# Patient Record
Sex: FEMALE | Race: WHITE | NOT HISPANIC OR LATINO | Employment: FULL TIME | URBAN - METROPOLITAN AREA
[De-identification: names, ages, dates, MRNs, and addresses within clinical notes are randomized per-mention and may not be internally consistent; named-entity substitution may affect disease eponyms.]

---

## 2017-01-23 ENCOUNTER — OFFICE VISIT (OUTPATIENT)
Dept: OBSTETRICS AND GYNECOLOGY | Facility: CLINIC | Age: 22
End: 2017-01-23

## 2017-01-23 VITALS
BODY MASS INDEX: 34.53 KG/M2 | HEIGHT: 67 IN | SYSTOLIC BLOOD PRESSURE: 116 MMHG | WEIGHT: 220 LBS | DIASTOLIC BLOOD PRESSURE: 72 MMHG | RESPIRATION RATE: 14 BRPM

## 2017-01-23 DIAGNOSIS — Z01.419 WELL WOMAN EXAM WITH ROUTINE GYNECOLOGICAL EXAM: Primary | ICD-10-CM

## 2017-01-23 DIAGNOSIS — N92.6 IRREGULAR MENSES: ICD-10-CM

## 2017-01-23 PROCEDURE — 99395 PREV VISIT EST AGE 18-39: CPT | Performed by: OBSTETRICS & GYNECOLOGY

## 2017-01-23 RX ORDER — ERGOCALCIFEROL 1.25 MG/1
50000 CAPSULE ORAL WEEKLY
COMMUNITY
End: 2018-01-19

## 2017-01-23 RX ORDER — MEDROXYPROGESTERONE ACETATE 150 MG/ML
150 INJECTION, SUSPENSION INTRAMUSCULAR
Qty: 1 EACH | Refills: 3 | Status: SHIPPED | OUTPATIENT
Start: 2017-01-23 | End: 2017-05-08 | Stop reason: ALTCHOICE

## 2017-01-23 NOTE — MR AVS SNAPSHOT
Alicia Rey   1/23/2017 2:10 PM   Office Visit    Dept Phone:  [phone]   Encounter #:  [id number]    Provider:  Prosper Borden MD   Department:  [facility]                Your Full Care Plan              Today's Medication Changes          These changes are accurate as of: 1/23/17  4:20 PM.  If you have any questions, ask your nurse or doctor.               New Medication(s)Ordered:     medroxyPROGESTERone 150 MG/ML injection   Commonly known as:  DEPO-PROVERA   Inject 1 mL into the shoulder, thigh, or buttocks Every 3 (Three) Months.   Started by:  Prosper Borden MD         Stop taking medication(s)listed here:     KRISTA-BE 0.35 MG tablet   Generic drug:  norethindrone   Stopped by:  Prosper Borden MD                Where to Get Your Medications      These medications were sent to [facility] [address] [place] [address] AT [facility] [phone] [place] [phone] [address] [address]     Phone:  [phone]     medroxyPROGESTERone 150 MG/ML injection                  Your Updated Medication List          This list is accurate as of: 1/23/17  4:20 PM.  Always use your most recent med list.                medroxyPROGESTERone 150 MG/ML injection   Commonly known as:  DEPO-PROVERA   Inject 1 mL into the shoulder, thigh, or buttocks Every 3 (Three) Months.       prenatal (CLASSIC) vitamin 28-0.8 MG tablet tablet       vitamin D 26299 UNITS capsule capsule   Commonly known as:  ERGOCALCIFEROL               You Were Diagnosed With        Codes Comments    Well woman exam with routine gynecological exam    -  Primary ICD-10-CM: Z01.419  ICD-9-CM: V72.31     Irregular menses     ICD-10-CM: N92.6  ICD-9-CM: 626.4       Instructions     None    Patient Instructions History      Upcoming Appointments     Visit Type Date Time Department    ANNUAL 1/23/2017  2:10 PM  "[facility]    INJECTION 2017  3:00 PM [facility]      MyChart Signup     [facility] allows you to send messages to your doctor, view your test results, renew your prescriptions, schedule appointments, and more. To sign up, go to [email or web address] and click on the Sign Up Now link in the New User? box. Enter your Gro Activation Code exactly as it appears below along with the last four digits of your Social Security Number and your Date of Birth () to complete the sign-up process. If you do not sign up before the expiration date, you must request a new code.    Gro Activation Code: [email or web address]  Expires: 2017  4:20 PM    If you have questions, you can email [email or web address] or call [phone] to talk to our Gro staff. Remember, Gro is NOT to be used for urgent needs. For medical emergencies, dial 911.               Other Info from Your Visit           Your Appointments     2017  3:00 PM EST   Injection with [facility]   [facility] [place] (--)    [address] [place] [id number] [phone]              Allergies     Wellbutrin [Bupropion]      Depakote [Divalproex Sodium]  GI Intolerance    Gabapentin  Irritability      Reason for Visit     Gynecologic Exam           Vital Signs     Blood Pressure Respirations Height Weight Last Menstrual Period Breastfeeding?    116/72 14 67\" (170.2 cm) 220 lb (99.8 kg) 2017 (Exact Date) Yes    Body Mass Index Smoking Status                34.46 kg/m2 Current Every Day Smoker          Problems and Diagnoses Noted     Well woman exam with routine gynecological exam    Irregular menses            "

## 2017-01-23 NOTE — PROGRESS NOTES
Subjective   Chief Complaint   Patient presents with   • Gynecologic Exam     Alicia Rey is a 21 y.o. year old  presenting to be seen for her annual exam.     SEXUAL Hx:  She is currently sexually active.  In the past year there has not been new sexual partners.    Condoms are typically used.  She would like to be screened for STD's at today's exam.  Current birth control method: OCP (progestin only).  She is not happy with her current method of contraception and does want to discuss alternative methods of contraception.  MENSTRUAL Hx:  Patient's last menstrual period was 2017 (exact date).  In the past 6 months her cycles have been unpredictable irregular.   Her menstrual flow is normal.   Each month on average there are roughly 4 days of very heavy flow.    Intermenstrual bleeding is absent.    Post-coital bleeding is absent.  Dysmenorrhea: is not affecting her activities of daily living  PMS: is not affecting her activities of daily living  Her cycles are a source of concern for her that she wishes to discuss today.  HEALTH Hx:  She exercises regularly: yes.  She wears her seat belt:yes.  She has concerns about domestic violence: no.  OTHER COMPLAINTS:  none    The following portions of the patient's history were reviewed and updated as appropriate:problem list, current medications, allergies, past family history, past medical history, past social history and past surgical history.    Smoking status: Current Every Day Smoker                                                   Packs/day: 0.25      Years: 0.00         Start date:      Smokeless status: Not on file                       Review of Systems  Consitutional POS: nothing reported    NEG: anorexia or night sweats   Gastointestinal POS: constipation and diarrhea    NEG: bloating, change in bowel habits, melena or reflux symptoms   Genitourinary POS: nothing reported    NEG: dysuria or hematuria   Integument POS: nothing reported    NEG:  "moles that are changing in size, shape, color or rashes   Breast POS: nothing reported    NEG: persistent breast lump, skin dimpling or nipple discharge        Objective   Visit Vitals   • /72   • Resp 14   • Ht 67\" (170.2 cm)   • Wt 220 lb (99.8 kg)   • LMP 01/18/2017 (Exact Date)   • Breastfeeding Yes   • BMI 34.46 kg/m2       General:  well developed; well nourished  no acute distress   Skin:  No suspicious lesions seen   Thyroid: normal to inspection and palpation   Breasts:  Not performed.   Abdomen: soft, non-tender; no masses  no umbilical or inginual hernias are present  no hepato-splenomegaly   Pelvis: Clinical staff was present for exam  External genitalia:  normal appearance of the external genitalia including Bartholin's and Casselman's glands.  :  urethral meatus normal; urethral hypermobility is absent.  Vaginal:  normal pink mucosa without prolapse or lesions.  Cervix:  normal appearance.  Uterus:  normal size, shape and consistency. retroverted;  Adnexa:  non palpable bilaterally.  Rectal:  digital rectal exam not performed; anus visually normal appearing.        Assessment   1. Normal GYN exam  2. Irregular menses - not significantly affecting her activities of daily living.  3. Desires Depo for ongoing contraception     Plan   1. Pap was not done today.  I explained to Alicia that the recommendations for Pap smear interval in a low risk patient has lengthened to 3 years time.  I told Alicia she still needs to be seen in our office yearly for a full physical including breast and pelvic exam.  2. The following tests were ordered today: STD swabs for GC, chlamydia and trichimoniasis.  It was explained to Alicia that all lab test should be back within the one week after they are performed. She will be notified about the results, regardless of the findings. If she has not been contacted by the office within 2 weeks after the test has been performed, it is her responsibility to contact us to learn " about her results.  3. Start Depo-provera ASAP  4. Follow up for annual exam 1 year    New Medications Ordered This Visit   Medications   • medroxyPROGESTERone (DEPO-PROVERA) 150 MG/ML injection     Sig: Inject 1 mL into the shoulder, thigh, or buttocks Every 3 (Three) Months.     Dispense:  1 each     Refill:  3          This note was electronically signed.    Prosper Borden M.D.  January 23, 2017

## 2017-01-24 ENCOUNTER — CLINICAL SUPPORT (OUTPATIENT)
Dept: OBSTETRICS AND GYNECOLOGY | Facility: CLINIC | Age: 22
End: 2017-01-24

## 2017-01-24 DIAGNOSIS — Z30.013 ENCOUNTER FOR INITIAL PRESCRIPTION OF INJECTABLE CONTRACEPTIVE: Primary | ICD-10-CM

## 2017-01-24 PROCEDURE — 96372 THER/PROPH/DIAG INJ SC/IM: CPT | Performed by: OBSTETRICS & GYNECOLOGY

## 2017-01-24 RX ORDER — MEDROXYPROGESTERONE ACETATE 150 MG/ML
150 INJECTION, SUSPENSION INTRAMUSCULAR ONCE
Status: COMPLETED | OUTPATIENT
Start: 2017-01-24 | End: 2017-01-24

## 2017-01-24 RX ADMIN — MEDROXYPROGESTERONE ACETATE 150 MG: 150 INJECTION, SUSPENSION INTRAMUSCULAR at 16:09

## 2017-05-08 ENCOUNTER — TELEPHONE (OUTPATIENT)
Dept: OBSTETRICS AND GYNECOLOGY | Facility: CLINIC | Age: 22
End: 2017-05-08

## 2017-05-08 RX ORDER — ACETAMINOPHEN AND CODEINE PHOSPHATE 120; 12 MG/5ML; MG/5ML
1 SOLUTION ORAL DAILY
Qty: 28 TABLET | Refills: 12 | Status: SHIPPED | OUTPATIENT
Start: 2017-05-08 | End: 2018-01-19

## 2017-08-02 ENCOUNTER — TELEPHONE (OUTPATIENT)
Dept: OBSTETRICS AND GYNECOLOGY | Facility: CLINIC | Age: 22
End: 2017-08-02

## 2017-08-02 DIAGNOSIS — O02.1 MISSED ABORTION: Primary | ICD-10-CM

## 2017-08-02 NOTE — TELEPHONE ENCOUNTER
----- Message from Carmel Amaya sent at 8/2/2017  3:31 PM EDT -----  Patient miscarried Friday 7/25/17 she just quit bleeding 2 days a go, wants to know how long her stomach should continue swollen.

## 2017-08-02 NOTE — TELEPHONE ENCOUNTER
Given the information we have, that's hard to answer.  I would expect her symptoms to be gradually improving, pain to gradually improving bleeding to gradually improved as well.  If any of these are not true she does need to be seen to have this further investigated.    We would want her to have a pregnancy test done about 2 weeks after she thinks the miscarriage occurred to make sure in fact that all of the tissue has passed.

## 2017-08-07 NOTE — TELEPHONE ENCOUNTER
Pt notified. 2 weeks will be this Friday 08/11/2017. [facility] order placed.

## 2017-09-06 ENCOUNTER — TELEPHONE (OUTPATIENT)
Dept: OBSTETRICS AND GYNECOLOGY | Facility: CLINIC | Age: 22
End: 2017-09-06

## 2017-10-11 ENCOUNTER — TELEPHONE (OUTPATIENT)
Dept: OBSTETRICS AND GYNECOLOGY | Facility: CLINIC | Age: 22
End: 2017-10-11

## 2017-10-11 DIAGNOSIS — Z34.90 PREGNANCY, UNSPECIFIED GESTATIONAL AGE: Primary | ICD-10-CM

## 2017-10-12 ENCOUNTER — TELEPHONE (OUTPATIENT)
Dept: OBSTETRICS AND GYNECOLOGY | Facility: CLINIC | Age: 22
End: 2017-10-12

## 2017-10-12 ENCOUNTER — APPOINTMENT (OUTPATIENT)
Dept: LAB | Facility: HOSPITAL | Age: 22
End: 2017-10-12

## 2017-10-12 LAB — HCG INTACT+B SERPL-ACNC: <5 MIU/ML

## 2017-10-12 PROCEDURE — 84702 CHORIONIC GONADOTROPIN TEST: CPT | Performed by: OBSTETRICS & GYNECOLOGY

## 2017-10-12 PROCEDURE — 36415 COLL VENOUS BLD VENIPUNCTURE: CPT | Performed by: OBSTETRICS & GYNECOLOGY

## 2017-12-01 ENCOUNTER — TELEPHONE (OUTPATIENT)
Dept: OBSTETRICS AND GYNECOLOGY | Facility: CLINIC | Age: 22
End: 2017-12-01

## 2017-12-01 NOTE — TELEPHONE ENCOUNTER
I called Clarisse to discuss her complaints.  She states that she had a UTI appx. 1 month ago and was treated with Bactrim.  Now she is having abdominal pain, vaginal discharge, itching, and dysuria.  She is unable to come to the office this afternoon because she has no transportation.  We talked about the need to distinguish between a UTI and a vaginal infection so that the appropriate treatment can be given.  She will seek care at an urgent treatment center later today when she can arrange transportation.

## 2017-12-13 ENCOUNTER — HOSPITAL ENCOUNTER (EMERGENCY)
Facility: HOSPITAL | Age: 22
Discharge: HOME | End: 2017-12-14
Attending: EMERGENCY MEDICINE | Admitting: NURSE PRACTITIONER

## 2017-12-13 VITALS
HEART RATE: 118 BPM | TEMPERATURE: 97.8 F | SYSTOLIC BLOOD PRESSURE: 111 MMHG | RESPIRATION RATE: 18 BRPM | OXYGEN SATURATION: 96 % | DIASTOLIC BLOOD PRESSURE: 65 MMHG | HEIGHT: 69 IN | BODY MASS INDEX: 30.51 KG/M2 | WEIGHT: 206 LBS

## 2017-12-13 DIAGNOSIS — Z34.90 EARLY STAGE OF PREGNANCY: ICD-10-CM

## 2017-12-13 DIAGNOSIS — R51.9 NONINTRACTABLE HEADACHE, UNSPECIFIED CHRONICITY PATTERN, UNSPECIFIED HEADACHE TYPE: Primary | ICD-10-CM

## 2017-12-13 LAB
ALBUMIN SERPL-MCNC: 4.8 G/DL (ref 3.2–4.8)
ALBUMIN/GLOB SERPL: 1.6 G/DL (ref 1.5–2.5)
ALP SERPL-CCNC: 110 U/L (ref 25–100)
ALT SERPL W P-5'-P-CCNC: 21 U/L (ref 7–40)
ANION GAP SERPL CALCULATED.3IONS-SCNC: 10 MMOL/L (ref 3–11)
AST SERPL-CCNC: 18 U/L (ref 0–33)
B-HCG UR QL: POSITIVE
BASOPHILS # BLD AUTO: 0.01 10*3/MM3 (ref 0–0.2)
BASOPHILS NFR BLD AUTO: 0.1 % (ref 0–1)
BILIRUB SERPL-MCNC: 0.5 MG/DL (ref 0.3–1.2)
BUN BLD-MCNC: 10 MG/DL (ref 9–23)
BUN/CREAT SERPL: 16.7 (ref 7–25)
CALCIUM SPEC-SCNC: 9.9 MG/DL (ref 8.7–10.4)
CHLORIDE SERPL-SCNC: 106 MMOL/L (ref 99–109)
CO2 SERPL-SCNC: 24 MMOL/L (ref 20–31)
CREAT BLD-MCNC: 0.6 MG/DL (ref 0.6–1.3)
DEPRECATED RDW RBC AUTO: 42.1 FL (ref 37–54)
EOSINOPHIL # BLD AUTO: 0.03 10*3/MM3 (ref 0–0.3)
EOSINOPHIL NFR BLD AUTO: 0.3 % (ref 0–3)
ERYTHROCYTE [DISTWIDTH] IN BLOOD BY AUTOMATED COUNT: 13.4 % (ref 11.3–14.5)
GFR SERPL CREATININE-BSD FRML MDRD: 125 ML/MIN/1.73
GLOBULIN UR ELPH-MCNC: 3 GM/DL
GLUCOSE BLD-MCNC: 92 MG/DL (ref 70–100)
HCG INTACT+B SERPL-ACNC: 326 MIU/ML
HCT VFR BLD AUTO: 43.7 % (ref 34.5–44)
HGB BLD-MCNC: 14.6 G/DL (ref 11.5–15.5)
HOLD SPECIMEN: NORMAL
HOLD SPECIMEN: NORMAL
IMM GRANULOCYTES # BLD: 0.03 10*3/MM3 (ref 0–0.03)
IMM GRANULOCYTES NFR BLD: 0.3 % (ref 0–0.6)
INTERNAL NEGATIVE CONTROL: ABNORMAL
INTERNAL POSITIVE CONTROL: ABNORMAL
LIPASE SERPL-CCNC: 31 U/L (ref 6–51)
LYMPHOCYTES # BLD AUTO: 2.14 10*3/MM3 (ref 0.6–4.8)
LYMPHOCYTES NFR BLD AUTO: 20.6 % (ref 24–44)
Lab: ABNORMAL
MCH RBC QN AUTO: 28.8 PG (ref 27–31)
MCHC RBC AUTO-ENTMCNC: 33.4 G/DL (ref 32–36)
MCV RBC AUTO: 86.2 FL (ref 80–99)
MONOCYTES # BLD AUTO: 0.57 10*3/MM3 (ref 0–1)
MONOCYTES NFR BLD AUTO: 5.5 % (ref 0–12)
NEUTROPHILS # BLD AUTO: 7.62 10*3/MM3 (ref 1.5–8.3)
NEUTROPHILS NFR BLD AUTO: 73.2 % (ref 41–71)
PLATELET # BLD AUTO: 319 10*3/MM3 (ref 150–450)
PMV BLD AUTO: 9.8 FL (ref 6–12)
POTASSIUM BLD-SCNC: 3.6 MMOL/L (ref 3.5–5.5)
PROT SERPL-MCNC: 7.8 G/DL (ref 5.7–8.2)
RBC # BLD AUTO: 5.07 10*6/MM3 (ref 3.89–5.14)
SODIUM BLD-SCNC: 140 MMOL/L (ref 132–146)
WBC NRBC COR # BLD: 10.4 10*3/MM3 (ref 3.5–10.8)
WHOLE BLOOD HOLD SPECIMEN: NORMAL
WHOLE BLOOD HOLD SPECIMEN: NORMAL

## 2017-12-13 PROCEDURE — 85025 COMPLETE CBC W/AUTO DIFF WBC: CPT | Performed by: EMERGENCY MEDICINE

## 2017-12-13 PROCEDURE — 80053 COMPREHEN METABOLIC PANEL: CPT | Performed by: EMERGENCY MEDICINE

## 2017-12-13 PROCEDURE — 93005 ELECTROCARDIOGRAM TRACING: CPT

## 2017-12-13 PROCEDURE — 84702 CHORIONIC GONADOTROPIN TEST: CPT | Performed by: EMERGENCY MEDICINE

## 2017-12-13 PROCEDURE — 99283 EMERGENCY DEPT VISIT LOW MDM: CPT

## 2017-12-13 PROCEDURE — 81001 URINALYSIS AUTO W/SCOPE: CPT | Performed by: EMERGENCY MEDICINE

## 2017-12-13 PROCEDURE — 83690 ASSAY OF LIPASE: CPT | Performed by: EMERGENCY MEDICINE

## 2017-12-13 RX ORDER — SODIUM CHLORIDE 0.9 % (FLUSH) 0.9 %
10 SYRINGE (ML) INJECTION AS NEEDED
Status: DISCONTINUED | OUTPATIENT
Start: 2017-12-13 | End: 2017-12-14 | Stop reason: HOSPADM

## 2017-12-13 RX ORDER — ACETAMINOPHEN 500 MG
1000 TABLET ORAL ONCE
Status: COMPLETED | OUTPATIENT
Start: 2017-12-13 | End: 2017-12-13

## 2017-12-13 RX ADMIN — ACETAMINOPHEN 1000 MG: 500 TABLET ORAL at 23:42

## 2017-12-14 LAB
BACTERIA UR QL AUTO: ABNORMAL /HPF
BILIRUB UR QL STRIP: NEGATIVE
CLARITY UR: ABNORMAL
COLOR UR: YELLOW
GLUCOSE UR STRIP-MCNC: NEGATIVE MG/DL
HGB UR QL STRIP.AUTO: ABNORMAL
HYALINE CASTS UR QL AUTO: ABNORMAL /LPF
KETONES UR QL STRIP: ABNORMAL
LEUKOCYTE ESTERASE UR QL STRIP.AUTO: NEGATIVE
NITRITE UR QL STRIP: NEGATIVE
PH UR STRIP.AUTO: 6 [PH] (ref 5–8)
PROT UR QL STRIP: NEGATIVE
RBC # UR: ABNORMAL /HPF
REF LAB TEST METHOD: ABNORMAL
SP GR UR STRIP: 1.02 (ref 1–1.03)
SQUAMOUS #/AREA URNS HPF: ABNORMAL /HPF
UROBILINOGEN UR QL STRIP: ABNORMAL
WBC UR QL AUTO: ABNORMAL /HPF

## 2017-12-14 NOTE — ED PROVIDER NOTES
Subjective   HPI Comments: Ms. Alicia Rey is a 22 y.o. female who is 4 weeks pregnant and presents to the ED with c/o dizziness. Ms. Rey is sitting comfortably and playing on her phone when I enter the room. She reports that she was at work earlier today when she suddenly developed tunnel vision, blurry vision, dizziness, and back pain and shortly after also developed a migraine that starts in the front of her head and radiates to the back of her head. She also complains of urinary frequency and pain when turning her neck but she denies dysuria, vaginal bleeding, and vaginal discharge. She states that her last period was November 15th. Her OBGYN is Dr. Borden, who she has been seeing for her pregnancy but she has not had an ultrasound yet. She has not taken any medications for her pain. No other acute complaints at this time.    G/P : 11/2    Patient is a 22 y.o. female presenting with pregnancy problem.   History provided by:  Patient  Pregnancy Problem   The current episode started today. The problem has been gradually improving. The problem occurs constantly. Episode is moderate. Gestational age is 4 weeks. Patient reports no vaginal bleeding and no vaginal discharge. Primary symptoms: dizziness, back pain.   Associated symptoms include no dysuria.       Review of Systems   Genitourinary: Positive for frequency. Negative for dysuria, vaginal bleeding and vaginal discharge.   Musculoskeletal: Positive for neck pain.   All other systems reviewed and are negative.      Past Medical History:   Diagnosis Date   • Bipolar affective disorder    • Post partum depression    • PTSD (post-traumatic stress disorder)        Allergies   Allergen Reactions   • Naproxen Hives   • Tomato Hives   • Wellbutrin [Bupropion]    • Depakote [Divalproex Sodium] GI Intolerance   • Gabapentin Irritability       Past Surgical History:   Procedure Laterality Date   • TOOTH EXTRACTION  10/2016   • WISDOM TOOTH EXTRACTION  2010        Family History   Problem Relation Age of Onset   • Breast cancer Maternal Aunt    • Breast cancer Maternal Grandmother        Social History     Social History   • Marital status: Single     Spouse name: N/A   • Number of children: N/A   • Years of education: N/A     Social History Main Topics   • Smoking status: Current Every Day Smoker     Packs/day: 0.25     Start date:    • Smokeless tobacco: None   • Alcohol use 1.2 oz/week     2 Cans of beer per week   • Drug use: Yes     Special: Marijuana, MDMA (ecstacy)      Comment: Last used MDMA    • Sexual activity: Yes     Partners: Male     Other Topics Concern   • None     Social History Narrative   • None         Objective   Physical Exam   Constitutional: She is oriented to person, place, and time. She appears well-developed and well-nourished. No distress.   Pt is sitting in bed at this time, playing a game on her cell phone.    HENT:   Head: Normocephalic and atraumatic.   Right Ear: External ear normal.   Left Ear: External ear normal.   Nose: Nose normal.   Mouth/Throat: Oropharynx is clear and moist. No oropharyngeal exudate.   Eyes: EOM are normal. Pupils are equal, round, and reactive to light.   Neck: Normal range of motion.   Cardiovascular: Tachycardia present.    Pulmonary/Chest: Effort normal and breath sounds normal. No respiratory distress.   Abdominal: Soft. Bowel sounds are normal. She exhibits no distension. There is no tenderness.   Genitourinary:   Genitourinary Comments:    Musculoskeletal: Normal range of motion. She exhibits no tenderness.   Neurological: She is alert and oriented to person, place, and time. No cranial nerve deficit.   Skin: Skin is warm and dry.   Psychiatric: She has a normal mood and affect.   Nursing note and vitals reviewed.      Procedures         ED Course  ED Course   Comment By Time   Pt declined IV at this time.  Patient will be discharged home.  Patient to follow-up with OB/GYN.  Encouraged to  return for any vaginal bleeding, leaking of any fluid or any worsening symptoms.  Patient agrees and verbalizes understanding. Yane Parham, APRN 12/13 0457         Recent Results (from the past 24 hour(s))   Comprehensive Metabolic Panel    Collection Time: 12/13/17  9:43 PM   Result Value Ref Range    Glucose 92 70 - 100 mg/dL    BUN 10 9 - 23 mg/dL    Creatinine 0.60 0.60 - 1.30 mg/dL    Sodium 140 132 - 146 mmol/L    Potassium 3.6 3.5 - 5.5 mmol/L    Chloride 106 99 - 109 mmol/L    CO2 24.0 20.0 - 31.0 mmol/L    Calcium 9.9 8.7 - 10.4 mg/dL    Total Protein 7.8 5.7 - 8.2 g/dL    Albumin 4.80 3.20 - 4.80 g/dL    ALT (SGPT) 21 7 - 40 U/L    AST (SGOT) 18 0 - 33 U/L    Alkaline Phosphatase 110 (H) 25 - 100 U/L    Total Bilirubin 0.5 0.3 - 1.2 mg/dL    eGFR Non African Amer 125 >60 mL/min/1.73    Globulin 3.0 gm/dL    A/G Ratio 1.6 1.5 - 2.5 g/dL    BUN/Creatinine Ratio 16.7 7.0 - 25.0    Anion Gap 10.0 3.0 - 11.0 mmol/L   Lipase    Collection Time: 12/13/17  9:43 PM   Result Value Ref Range    Lipase 31 6 - 51 U/L   Light Blue Top    Collection Time: 12/13/17  9:43 PM   Result Value Ref Range    Extra Tube hold for add-on    Green Top (Gel)    Collection Time: 12/13/17  9:43 PM   Result Value Ref Range    Extra Tube Hold for add-ons.    Lavender Top    Collection Time: 12/13/17  9:43 PM   Result Value Ref Range    Extra Tube hold for add-on    Gold Top - SST    Collection Time: 12/13/17  9:43 PM   Result Value Ref Range    Extra Tube Hold for add-ons.    CBC Auto Differential    Collection Time: 12/13/17  9:43 PM   Result Value Ref Range    WBC 10.40 3.50 - 10.80 10*3/mm3    RBC 5.07 3.89 - 5.14 10*6/mm3    Hemoglobin 14.6 11.5 - 15.5 g/dL    Hematocrit 43.7 34.5 - 44.0 %    MCV 86.2 80.0 - 99.0 fL    MCH 28.8 27.0 - 31.0 pg    MCHC 33.4 32.0 - 36.0 g/dL    RDW 13.4 11.3 - 14.5 %    RDW-SD 42.1 37.0 - 54.0 fl    MPV 9.8 6.0 - 12.0 fL    Platelets 319 150 - 450 10*3/mm3    Neutrophil % 73.2 (H) 41.0 - 71.0 %     "Lymphocyte % 20.6 (L) 24.0 - 44.0 %    Monocyte % 5.5 0.0 - 12.0 %    Eosinophil % 0.3 0.0 - 3.0 %    Basophil % 0.1 0.0 - 1.0 %    Immature Grans % 0.3 0.0 - 0.6 %    Neutrophils, Absolute 7.62 1.50 - 8.30 10*3/mm3    Lymphocytes, Absolute 2.14 0.60 - 4.80 10*3/mm3    Monocytes, Absolute 0.57 0.00 - 1.00 10*3/mm3    Eosinophils, Absolute 0.03 0.00 - 0.30 10*3/mm3    Basophils, Absolute 0.01 0.00 - 0.20 10*3/mm3    Immature Grans, Absolute 0.03 0.00 - 0.03 10*3/mm3   hCG, Quantitative, Pregnancy    Collection Time: 12/13/17  9:43 PM   Result Value Ref Range    HCG Quantitative 326.00 mIU/mL   POCT pregnancy, urine    Collection Time: 12/13/17 11:52 PM   Result Value Ref Range    HCG, Urine, QL Positive (A) Negative    Lot Number [id number]     Internal Positive Control Presumptive Positive     Internal Negative Control Presumptive Negative      Note: In addition to lab results from this visit, the labs listed above may include labs taken at another facility or during a different encounter within the last 24 hours. Please correlate lab times with ED admission and discharge times for further clarification of the services performed during this visit.    No orders to display     Vitals:    12/13/17 2132 12/13/17 2344 12/13/17 2345   BP: 119/69 111/65    Pulse: 118     Resp: 18     Temp: 97.8 °F (36.6 °C)     TempSrc: Oral     SpO2: 98%  96%   Weight: 93.4 kg (206 lb)     Height: 175.3 cm (69\")       Medications   sodium chloride 0.9 % flush 10 mL (not administered)   sodium chloride 0.9 % flush 10 mL (not administered)   sodium chloride 0.9 % bolus 1,000 mL (not administered)   acetaminophen (TYLENOL) tablet 1,000 mg (1,000 mg Oral Given 12/13/17 2342)     ECG/EMG Results (last 24 hours)     Procedure Component Value Units Date/Time    ECG 12 Lead [[id number]] Collected:  12/13/17 2141     Updated:  12/13/17 2140                    [facility]    Final diagnoses:   Nonintractable headache, unspecified chronicity pattern, " unspecified headache type   Early stage of pregnancy       Documentation assistance provided by bree Pickett.  Information recorded by the bree was done at my direction and has been verified and validated by me.     Mirna Pickett  12/13/17 2312       Mirna Pickett  12/14/17 0002       Yane Parham, APRN  12/14/17 3434

## 2017-12-19 ENCOUNTER — TELEPHONE (OUTPATIENT)
Dept: OBSTETRICS AND GYNECOLOGY | Facility: CLINIC | Age: 22
End: 2017-12-19

## 2017-12-19 NOTE — TELEPHONE ENCOUNTER
DR JACOB PATIENT THINKS HAS THE FLU - AND WANTS TO KNOW WHAT NEEDS TO DO AND WHAT MEDICATION TO TAKE

## 2018-01-03 ENCOUNTER — OFFICE VISIT (OUTPATIENT)
Dept: RETAIL CLINIC | Facility: CLINIC | Age: 23
End: 2018-01-03

## 2018-01-03 VITALS
BODY MASS INDEX: 30.36 KG/M2 | HEART RATE: 84 BPM | HEIGHT: 69 IN | WEIGHT: 205 LBS | RESPIRATION RATE: 20 BRPM | TEMPERATURE: 98.5 F | OXYGEN SATURATION: 98 %

## 2018-01-03 DIAGNOSIS — H66.92 LEFT OTITIS MEDIA, UNSPECIFIED OTITIS MEDIA TYPE: Primary | ICD-10-CM

## 2018-01-03 DIAGNOSIS — H60.502 ACUTE OTITIS EXTERNA OF LEFT EAR, UNSPECIFIED TYPE: ICD-10-CM

## 2018-01-03 PROCEDURE — 99213 OFFICE O/P EST LOW 20 MIN: CPT | Performed by: NURSE PRACTITIONER

## 2018-01-03 RX ORDER — NITROFURANTOIN 25; 75 MG/1; MG/1
100 CAPSULE ORAL 2 TIMES DAILY
COMMUNITY
End: 2018-01-19

## 2018-01-03 RX ORDER — CEPHALEXIN 250 MG/1
500 CAPSULE ORAL 2 TIMES DAILY
Qty: 40 CAPSULE | Refills: 0 | Status: SHIPPED | OUTPATIENT
Start: 2018-01-03 | End: 2018-01-13

## 2018-01-03 NOTE — PROGRESS NOTES
Subjective   Alicia Rey is a 22 y.o. female.     Earache    There is pain in the left ear. This is a new problem. The current episode started today. The problem occurs constantly. The problem has been gradually worsening. There has been no fever. The pain is at a severity of 4/10. The pain is mild. Associated symptoms include coughing, headaches (mild) and rhinorrhea. Pertinent negatives include no abdominal pain, diarrhea, ear discharge, hearing loss, neck pain, rash, sore throat or vomiting. She has tried nothing for the symptoms. The treatment provided no relief. Her past medical history is significant for a chronic ear infection. There is no history of a tympanostomy tube.        Current Outpatient Prescriptions on File Prior to Visit   Medication Sig Dispense Refill   • Prenatal Vit-Fe Fumarate-FA (PRENATAL, CLASSIC, VITAMIN) 28-0.8 MG tablet tablet Take  by mouth daily.     • vitamin D (ERGOCALCIFEROL) 66055 UNITS capsule capsule Take 50,000 Units by mouth 1 (One) Time Per Week.     • norethindrone (NOR-QD) 0.35 MG tablet Take 1 tablet by mouth Daily. 28 tablet 12     No current facility-administered medications on file prior to visit.        Allergies   Allergen Reactions   • Bactrim [Sulfamethoxazole-Trimethoprim] Nausea And Vomiting   • Ciprofloxacin Hcl Nausea And Vomiting   • Naproxen Hives   • Tomato Hives   • Wellbutrin [Bupropion]    • Depakote [Divalproex Sodium] GI Intolerance   • Gabapentin Irritability       Past Medical History:   Diagnosis Date   • Bipolar affective disorder    • Post partum depression    • PTSD (post-traumatic stress disorder)        Past Surgical History:   Procedure Laterality Date   • TOOTH EXTRACTION  10/2016   • WISDOM TOOTH EXTRACTION  2010       Family History   Problem Relation Age of Onset   • Breast cancer Maternal Aunt    • Breast cancer Maternal Grandmother        Social History     Social History   • Marital status: Single     Spouse name: N/A   • Number of  "children: N/A   • Years of education: N/A     Occupational History   • Not on file.     Social History Main Topics   • Smoking status: Current Every Day Smoker     Packs/day: 0.25     Start date: 2009   • Smokeless tobacco: Not on file   • Alcohol use 1.2 oz/week     2 Cans of beer per week   • Drug use: Yes     Special: Marijuana, MDMA (ecstacy)      Comment: Last used MDMA 2011   • Sexual activity: Yes     Partners: Male     Other Topics Concern   • Not on file     Social History Narrative   • No narrative on file       Review of Systems   Constitutional: Positive for activity change and appetite change.   HENT: Positive for ear pain, rhinorrhea and sneezing. Negative for ear discharge, hearing loss, sinus pain, sinus pressure and sore throat.    Eyes: Negative for pain, discharge and itching.   Respiratory: Positive for cough.    Gastrointestinal: Negative for abdominal pain, diarrhea and vomiting.   Musculoskeletal: Positive for myalgias (mild). Negative for neck pain.   Skin: Negative for rash.   Allergic/Immunologic: Negative for environmental allergies.   Neurological: Positive for headaches (mild).       Pulse 84  Temp 98.5 °F (36.9 °C) (Oral)   Resp 20  Ht 175.3 cm (69\")  Wt 93 kg (205 lb)  LMP 11/15/2017 (Exact Date)  SpO2 98%  BMI 30.27 kg/m2    Objective   Physical Exam   Constitutional: She is oriented to person, place, and time. She appears well-developed and well-nourished. She is cooperative. She appears ill.   HENT:   Head: Normocephalic and atraumatic.   Right Ear: Tympanic membrane, external ear and ear canal normal.   Left Ear: Ear canal normal. There is swelling and tenderness. Tympanic membrane is erythematous.   Nose: Rhinorrhea present. Right sinus exhibits no maxillary sinus tenderness and no frontal sinus tenderness. Left sinus exhibits no maxillary sinus tenderness and no frontal sinus tenderness.   Mouth/Throat: Uvula is midline, oropharynx is clear and moist and mucous membranes " are normal. No posterior oropharyngeal erythema.   Eyes: Conjunctivae and lids are normal.   Cardiovascular: Normal heart sounds.    Pulmonary/Chest: Effort normal and breath sounds normal.   Lymphadenopathy:     She has cervical adenopathy.   Neurological: She is alert and oriented to person, place, and time.   Skin: Skin is warm and dry. No rash noted.   Psychiatric: She has a normal mood and affect. Her speech is normal and behavior is normal.         Assessment/Plan   Diagnoses and all orders for this visit:    Left otitis media, unspecified otitis media type  -     cephalexin (KEFLEX) 250 MG capsule; Take 2 capsules by mouth 2 (Two) Times a Day for 10 days.    Acute otitis externa of left ear, unspecified type  -     cephalexin (KEFLEX) 250 MG capsule; Take 2 capsules by mouth 2 (Two) Times a Day for 10 days.        Results for orders placed or performed during the hospital encounter of 12/13/17   Comprehensive Metabolic Panel   Result Value Ref Range    Glucose 92 70 - 100 mg/dL    BUN 10 9 - 23 mg/dL    Creatinine 0.60 0.60 - 1.30 mg/dL    Sodium 140 132 - 146 mmol/L    Potassium 3.6 3.5 - 5.5 mmol/L    Chloride 106 99 - 109 mmol/L    CO2 24.0 20.0 - 31.0 mmol/L    Calcium 9.9 8.7 - 10.4 mg/dL    Total Protein 7.8 5.7 - 8.2 g/dL    Albumin 4.80 3.20 - 4.80 g/dL    ALT (SGPT) 21 7 - 40 U/L    AST (SGOT) 18 0 - 33 U/L    Alkaline Phosphatase 110 (H) 25 - 100 U/L    Total Bilirubin 0.5 0.3 - 1.2 mg/dL    eGFR Non African Amer 125 >60 mL/min/1.73    Globulin 3.0 gm/dL    A/G Ratio 1.6 1.5 - 2.5 g/dL    BUN/Creatinine Ratio 16.7 7.0 - 25.0    Anion Gap 10.0 3.0 - 11.0 mmol/L   Lipase   Result Value Ref Range    Lipase 31 6 - 51 U/L   Urinalysis With / Culture If Indicated - Urine, Clean Catch   Result Value Ref Range    Color, UA Yellow Yellow, Straw    Appearance, UA Cloudy (A) Clear    pH, UA 6.0 5.0 - 8.0    Specific Gravity, UA 1.016 1.001 - 1.030    Glucose, UA Negative Negative    Ketones, UA 15 mg/dL (1+)  (A) Negative    Bilirubin, UA Negative Negative    Blood, UA Small (1+) (A) Negative    Protein, UA Negative Negative    Leuk Esterase, UA Negative Negative    Nitrite, UA Negative Negative    Urobilinogen, UA 0.2 E.U./dL 0.2 - 1.0 E.U./dL   CBC Auto Differential   Result Value Ref Range    WBC 10.40 3.50 - 10.80 10*3/mm3    RBC 5.07 3.89 - 5.14 10*6/mm3    Hemoglobin 14.6 11.5 - 15.5 g/dL    Hematocrit 43.7 34.5 - 44.0 %    MCV 86.2 80.0 - 99.0 fL    MCH 28.8 27.0 - 31.0 pg    MCHC 33.4 32.0 - 36.0 g/dL    RDW 13.4 11.3 - 14.5 %    RDW-SD 42.1 37.0 - 54.0 fl    MPV 9.8 6.0 - 12.0 fL    Platelets 319 150 - 450 10*3/mm3    Neutrophil % 73.2 (H) 41.0 - 71.0 %    Lymphocyte % 20.6 (L) 24.0 - 44.0 %    Monocyte % 5.5 0.0 - 12.0 %    Eosinophil % 0.3 0.0 - 3.0 %    Basophil % 0.1 0.0 - 1.0 %    Immature Grans % 0.3 0.0 - 0.6 %    Neutrophils, Absolute 7.62 1.50 - 8.30 10*3/mm3    Lymphocytes, Absolute 2.14 0.60 - 4.80 10*3/mm3    Monocytes, Absolute 0.57 0.00 - 1.00 10*3/mm3    Eosinophils, Absolute 0.03 0.00 - 0.30 10*3/mm3    Basophils, Absolute 0.01 0.00 - 0.20 10*3/mm3    Immature Grans, Absolute 0.03 0.00 - 0.03 10*3/mm3   hCG, Quantitative, Pregnancy   Result Value Ref Range    HCG Quantitative 326.00 mIU/mL   Urinalysis, Microscopic Only - Urine, Clean Catch   Result Value Ref Range    RBC, UA 3-6 (A) None Seen, 0-2 /HPF    WBC, UA 0-2 (A) None Seen /HPF    Bacteria, UA None Seen None Seen, Trace /HPF    Squamous Epithelial Cells, UA 3-6 (A) None Seen, 0-2 /HPF    Hyaline Casts, UA 0-6 0 - 6 /LPF    Methodology Automated Microscopy    POCT pregnancy, urine   Result Value Ref Range    HCG, Urine, QL Positive (A) Negative    Lot Number [id number]     Internal Positive Control Presumptive Positive     Internal Negative Control Presumptive Negative    Light Blue Top   Result Value Ref Range    Extra Tube hold for add-on    Green Top (Gel)   Result Value Ref Range    Extra Tube Hold for add-ons.    Lavender Top    Result Value Ref Range    Extra Tube hold for add-on    Gold Top - SST   Result Value Ref Range    Extra Tube Hold for add-ons.        Follow up with PCP or go to the nearest emergency room if symptoms worsen or fail to improve.

## 2018-01-03 NOTE — PATIENT INSTRUCTIONS
"Otitis Externa  Otitis externa is a bacterial or fungal infection of the outer ear canal. This is the area from the eardrum to the outside of the ear. Otitis externa is sometimes called \"swimmer's ear.\"  CAUSES   Possible causes of infection include:  · Swimming in dirty water.  · Moisture remaining in the ear after swimming or bathing.  · Mild injury (trauma) to the ear.  · Objects stuck in the ear (foreign body).  · Cuts or scrapes (abrasions) on the outside of the ear.  SIGNS AND SYMPTOMS   The first symptom of infection is often itching in the ear canal. Later signs and symptoms may include swelling and redness of the ear canal, ear pain, and yellowish-white fluid (pus) coming from the ear. The ear pain may be worse when pulling on the earlobe.  DIAGNOSIS   Your health care provider will perform a physical exam. A sample of fluid may be taken from the ear and examined for bacteria or fungi.  TREATMENT   Antibiotic ear drops are often given for 10 to 14 days. Treatment may also include pain medicine or corticosteroids to reduce itching and swelling.  HOME CARE INSTRUCTIONS   · Apply antibiotic ear drops to the ear canal as prescribed by your health care provider.  · Take medicines only as directed by your health care provider.  · If you have diabetes, follow any additional treatment instructions from your health care provider.  · Keep all follow-up visits as directed by your health care provider.  PREVENTION   · Keep your ear dry. Use the corner of a towel to absorb water out of the ear canal after swimming or bathing.  · Avoid scratching or putting objects inside your ear. This can damage the ear canal or remove the protective wax that lines the canal. This makes it easier for bacteria and fungi to grow.  · Avoid swimming in lakes, polluted water, or poorly chlorinated pools.  · You may use ear drops made of rubbing alcohol and vinegar after swimming. Combine equal parts of white vinegar and alcohol in a bottle. " Put 3 or 4 drops into each ear after swimming.  SEEK MEDICAL CARE IF:   · You have a fever.  · Your ear is still red, swollen, painful, or draining pus after 3 days.  · Your redness, swelling, or pain gets worse.  · You have a severe headache.  · You have redness, swelling, pain, or tenderness in the area behind your ear.  MAKE SURE YOU:   · Understand these instructions.  · Will watch your condition.  · Will get help right away if you are not doing well or get worse.     This information is not intended to replace advice given to you by your health care provider. Make sure you discuss any questions you have with your health care provider.     Document Released: 12/18/2006 Document Revised: 01/08/2016 Document Reviewed: 09/26/2016  [employer] Interactive Patient Education ©2017 [employer]    Otitis Media, Adult  Otitis media is redness, soreness, and puffiness (swelling) in the space just behind your eardrum (middle ear). It may be caused by allergies or infection. It often happens along with a cold.  HOME CARE  · Take your medicine as told. Finish it even if you start to feel better.  · Only take over-the-counter or prescription medicines for pain, discomfort, or fever as told by your doctor.  · Follow up with your doctor as told.  GET HELP IF:  · You have otitis media only in one ear, or bleeding from your nose, or both.  · You notice a lump on your neck.  · You are not getting better in 3-5 days.  · You feel worse instead of better.  GET HELP RIGHT AWAY IF:   · You have pain that is not helped with medicine.  · You have puffiness, redness, or pain around your ear.  · You get a stiff neck.  · You cannot move part of your face (paralysis).  · You notice that the bone behind your ear hurts when you touch it.  MAKE SURE YOU:   · Understand these instructions.  · Will watch your condition.  · Will get help right away if you are not doing well or get worse.     This information is not intended to replace advice given  to you by your health care provider. Make sure you discuss any questions you have with your health care provider.     Document Released: 06/05/2009 Document Revised: 01/08/2016 Document Reviewed: 07/15/2014  [employer] Interactive Patient Education ©2017 [employer]

## 2018-01-04 PROBLEM — Z34.80 PRENATAL CARE, SUBSEQUENT PREGNANCY: Status: ACTIVE | Noted: 2018-01-04

## 2018-01-19 ENCOUNTER — APPOINTMENT (OUTPATIENT)
Dept: LAB | Facility: HOSPITAL | Age: 23
End: 2018-01-19

## 2018-01-19 ENCOUNTER — INITIAL PRENATAL (OUTPATIENT)
Dept: OBSTETRICS AND GYNECOLOGY | Facility: CLINIC | Age: 23
End: 2018-01-19

## 2018-01-19 VITALS — SYSTOLIC BLOOD PRESSURE: 116 MMHG | WEIGHT: 202 LBS | BODY MASS INDEX: 29.83 KG/M2 | DIASTOLIC BLOOD PRESSURE: 74 MMHG

## 2018-01-19 DIAGNOSIS — Z34.82 PRENATAL CARE, SUBSEQUENT PREGNANCY IN SECOND TRIMESTER: Primary | ICD-10-CM

## 2018-01-19 PROBLEM — O99.330 TOBACCO SMOKING AFFECTING PREGNANCY: Status: ACTIVE | Noted: 2018-01-19

## 2018-01-19 LAB
ABO GROUP BLD: NORMAL
AMPHET+METHAMPHET UR QL: NEGATIVE
AMPHETAMINES UR QL: NEGATIVE
BARBITURATES UR QL SCN: NEGATIVE
BASOPHILS # BLD AUTO: 0.01 10*3/MM3 (ref 0–0.2)
BASOPHILS NFR BLD AUTO: 0.1 % (ref 0–1)
BENZODIAZ UR QL SCN: NEGATIVE
BLD GP AB SCN SERPL QL: NEGATIVE
BUPRENORPHINE SERPL-MCNC: NEGATIVE NG/ML
CANNABINOIDS SERPL QL: NEGATIVE
COCAINE UR QL: NEGATIVE
DEPRECATED RDW RBC AUTO: 42.8 FL (ref 37–54)
EOSINOPHIL # BLD AUTO: 0.14 10*3/MM3 (ref 0–0.3)
EOSINOPHIL NFR BLD AUTO: 1.8 % (ref 0–3)
ERYTHROCYTE [DISTWIDTH] IN BLOOD BY AUTOMATED COUNT: 13.7 % (ref 11.3–14.5)
HBV SURFACE AG SERPL QL IA: NORMAL
HCT VFR BLD AUTO: 38 % (ref 34.5–44)
HGB BLD-MCNC: 12.6 G/DL (ref 11.5–15.5)
HIV1+2 AB SER QL: NORMAL
IMM GRANULOCYTES # BLD: 0.02 10*3/MM3 (ref 0–0.03)
IMM GRANULOCYTES NFR BLD: 0.3 % (ref 0–0.6)
LYMPHOCYTES # BLD AUTO: 1.94 10*3/MM3 (ref 0.6–4.8)
LYMPHOCYTES NFR BLD AUTO: 25.1 % (ref 24–44)
MCH RBC QN AUTO: 28.6 PG (ref 27–31)
MCHC RBC AUTO-ENTMCNC: 33.2 G/DL (ref 32–36)
MCV RBC AUTO: 86.2 FL (ref 80–99)
METHADONE UR QL SCN: NEGATIVE
MONOCYTES # BLD AUTO: 0.69 10*3/MM3 (ref 0–1)
MONOCYTES NFR BLD AUTO: 8.9 % (ref 0–12)
NEUTROPHILS # BLD AUTO: 4.94 10*3/MM3 (ref 1.5–8.3)
NEUTROPHILS NFR BLD AUTO: 63.8 % (ref 41–71)
OPIATES UR QL: NEGATIVE
OXYCODONE UR QL SCN: NEGATIVE
PCP UR QL SCN: NEGATIVE
PLATELET # BLD AUTO: 261 10*3/MM3 (ref 150–450)
PMV BLD AUTO: 10.1 FL (ref 6–12)
PROPOXYPH UR QL: NEGATIVE
RBC # BLD AUTO: 4.41 10*6/MM3 (ref 3.89–5.14)
RH BLD: POSITIVE
RUBV IGG SER QL: NORMAL
RUBV IGG SER-ACNC: 92 IU/ML
TRICYCLICS UR QL SCN: NEGATIVE
WBC NRBC COR # BLD: 7.74 10*3/MM3 (ref 3.5–10.8)

## 2018-01-19 PROCEDURE — 87086 URINE CULTURE/COLONY COUNT: CPT | Performed by: OBSTETRICS & GYNECOLOGY

## 2018-01-19 PROCEDURE — 86900 BLOOD TYPING SEROLOGIC ABO: CPT | Performed by: OBSTETRICS & GYNECOLOGY

## 2018-01-19 PROCEDURE — G0432 EIA HIV-1/HIV-2 SCREEN: HCPCS | Performed by: OBSTETRICS & GYNECOLOGY

## 2018-01-19 PROCEDURE — 85025 COMPLETE CBC W/AUTO DIFF WBC: CPT | Performed by: OBSTETRICS & GYNECOLOGY

## 2018-01-19 PROCEDURE — 86901 BLOOD TYPING SEROLOGIC RH(D): CPT | Performed by: OBSTETRICS & GYNECOLOGY

## 2018-01-19 PROCEDURE — 80081 OBSTETRIC PANEL INC HIV TSTG: CPT | Performed by: OBSTETRICS & GYNECOLOGY

## 2018-01-19 PROCEDURE — 99214 OFFICE O/P EST MOD 30 MIN: CPT | Performed by: OBSTETRICS & GYNECOLOGY

## 2018-01-19 PROCEDURE — 80306 DRUG TEST PRSMV INSTRMNT: CPT | Performed by: OBSTETRICS & GYNECOLOGY

## 2018-01-19 PROCEDURE — 36415 COLL VENOUS BLD VENIPUNCTURE: CPT | Performed by: OBSTETRICS & GYNECOLOGY

## 2018-01-19 PROCEDURE — 86850 RBC ANTIBODY SCREEN: CPT | Performed by: OBSTETRICS & GYNECOLOGY

## 2018-01-19 PROCEDURE — 87340 HEPATITIS B SURFACE AG IA: CPT | Performed by: OBSTETRICS & GYNECOLOGY

## 2018-01-19 PROCEDURE — 86762 RUBELLA ANTIBODY: CPT | Performed by: OBSTETRICS & GYNECOLOGY

## 2018-01-19 NOTE — PROGRESS NOTES
Subjective   Chief Complaint   Patient presents with   • Initial Prenatal Visit       Alicia Rey is a 22 y.o. year old .  Patient's last menstrual period was 11/15/2017 (exact date).  She presents to be seen to initiate prenatal care.     Smoking status: Current Every Day Smoker                                                   Packs/day: 0.25      Years: 0.00         Start date:   Smokeless status: Never Used                          The following portions of the patient's history were reviewed and updated as appropriate:vital signs, allergies, current medications, past medical history, past social history, past surgical history and problem list.    Objective   Lab Review   No data reviewed    Imaging   No data reviewed    Assessment/Plan   ASSESSMENT  1. 22 y.o. year old  at 9w2d by LMP  2. Supervision of low risk pregnancy    PLAN  1. The problem list for pregnancy was initiated today  2. Tests ordered today:  Orders Placed This Encounter   Procedures   • Urine Culture - Urine, Urine, Clean Catch   • HIV-1 / O / 2 Ag / Antibody 4th Generation   • Obstetric Panel   • Urine Drug Screen - Urine, Clean Catch     Please confirm all positive UDS     3. Testing for GC / Chlamydia / trichomonas will need to be done at her next prenatal visit  4. Genetic testing reviewed: MSAFP-4 and she will consider the information and make a decision at a later date.  5. I discussed tobacco use in pregnancy with Alicia.  The risk of continued use include: low birth weight and growth restriction in the fetus; placental abruption;  birth; in the first trimester, the risk of miscarraige is increased.  These effects are often related to the amount to tobacco used.  I encouraged Alicia to stop completely if at all possible.  If this is not an option, the nicotine replacement products including nicotine patches, gum and vaping are probobaly safer than tobacco.  However, these products are not as well studied  and should not be considered safe in pregnancy.   6. Information reviewed: exercise in pregnancy, nutrition in pregnancy, weight gain in pregnancy, work and travel restrictions during pregnancy, list of OTC medications acceptable in pregnancy and call coverage groups    Total time spent today with Alicia  was 30 minutes (level 4).  Off this time, 100% was spent face-to-face time coordinating care, answering her questions and counseling regarding pathophysiology of her presenting problem along with plans for any diagnositc work-up and treatment.    Follow up: 3 week(s)       This note was electronically signed.    Prosper Borden MD  January 19, 2018

## 2018-01-21 LAB
BACTERIA SPEC AEROBE CULT: NORMAL
BACTERIA SPEC AEROBE CULT: NORMAL

## 2018-01-22 ENCOUNTER — TELEPHONE (OUTPATIENT)
Dept: OBSTETRICS AND GYNECOLOGY | Facility: CLINIC | Age: 23
End: 2018-01-22

## 2018-01-22 LAB — RPR SER QL: NORMAL

## 2018-01-22 NOTE — TELEPHONE ENCOUNTER
Michi pt seen last week for new ob unknown lmp and is now having vaginal swelling and some vaginal bleeding. Please contact pt

## 2018-01-23 ENCOUNTER — TELEPHONE (OUTPATIENT)
Dept: OBSTETRICS AND GYNECOLOGY | Facility: CLINIC | Age: 23
End: 2018-01-23

## 2018-01-23 RX ORDER — FLUCONAZOLE 150 MG/1
150 TABLET ORAL DAILY
Qty: 1 TABLET | Refills: 0 | Status: SHIPPED | OUTPATIENT
Start: 2018-01-23 | End: 2018-03-23

## 2018-01-23 NOTE — TELEPHONE ENCOUNTER
Let her a Rx has been sent    New Medications Ordered This Visit   Medications   • fluconazole (DIFLUCAN) 150 MG tablet     Sig: Take 1 tablet by mouth Daily.     Dispense:  1 tablet     Refill:  0

## 2018-01-23 NOTE — TELEPHONE ENCOUNTER
DR JACOB    PT HAD U/S THIS MORNING. SHE ASKS IF WE COULD CALL IN A DIFLUCAN PILL FOR A POSSIBLE YEAST INFECTION.    PHARM: [facility] IN [place], [place] ..ON [facility].

## 2018-01-23 NOTE — TELEPHONE ENCOUNTER
Pt states that for the last  3 days she has had white discharge with itching. Ask Pt has tried anything OTC and Pt reply was she is allergic to monistat

## 2018-01-24 ENCOUNTER — TELEPHONE (OUTPATIENT)
Dept: OBSTETRICS AND GYNECOLOGY | Facility: CLINIC | Age: 23
End: 2018-01-24

## 2018-01-24 NOTE — TELEPHONE ENCOUNTER
She heard about questionable cystic hygroma and I told her I do not think it is there.  Will consider recheck in 2-3 weeks

## 2018-01-24 NOTE — TELEPHONE ENCOUNTER
----- Message from Colette Aleman MA sent at 1/24/2018  3:46 PM EST -----  Patient wants to talk with Dr. Borden

## 2018-02-09 ENCOUNTER — ROUTINE PRENATAL (OUTPATIENT)
Dept: OBSTETRICS AND GYNECOLOGY | Facility: CLINIC | Age: 23
End: 2018-02-09

## 2018-02-09 VITALS — WEIGHT: 199 LBS | SYSTOLIC BLOOD PRESSURE: 112 MMHG | DIASTOLIC BLOOD PRESSURE: 70 MMHG | BODY MASS INDEX: 29.39 KG/M2

## 2018-02-09 DIAGNOSIS — Z34.81 PRENATAL CARE, SUBSEQUENT PREGNANCY IN FIRST TRIMESTER: Primary | ICD-10-CM

## 2018-02-09 LAB
C TRACH RRNA SPEC DONR QL NAA+PROBE: NEGATIVE
N GONORRHOEA DNA SPEC QL NAA+PROBE: NEGATIVE

## 2018-02-09 PROCEDURE — 99213 OFFICE O/P EST LOW 20 MIN: CPT | Performed by: OBSTETRICS & GYNECOLOGY

## 2018-02-09 NOTE — PROGRESS NOTES
Chief Complaint   Patient presents with   • Routine Prenatal Visit       HPI: Alicia is a  currently at 12w2d who today reports the following:  Nausea - No; Vaginal bleeding -  No; Heartburn - No.    ROS:  GI: Constipation - No; Diarrhea - No    Neuro: Headache - No; Visual change - No      EXAM:  Vitals: See prenatal flowsheet   Abdomen: See prenatal flowsheet   Urine glucose/protein: See prenatal flowsheet   Pelvic: See prenatal flowsheet     Prenatal Labs  Lab Results   Component Value Date    HGB 12.6 2018    RUBELLAABIGG 92.0 2018    RUBELLAABIGG Immune 2018    HEPBSAG Non-Reactive 2018    LABRPR Non-reactive 2015    ABORH AB Rh Positive 03/15/2016    ABSCRN Negative 2018    LABANTI Negative 2015    SZXKYXY97 NonReactive 2015    VMK4XCC2 Non-Reactive 2018    TCHBSRK7DC 128 2015    URINECX 50,000-60,000 CFU/mL Normal Urogenital Janell 2018       MDM:  Impression: 1. Supervision of high risk pregnancy  2. Tobacco use in pregnancy   Tests done today: 1. PAP  2. GC/Chlamydia testing   Topics discussed: 1. Continue with PNV's  2. Prenatal labs reviewed  3. flu vaccine   Tests scheduled today for her next visit:   none   Next visit: See prenatal flowsheet     Today we discussed genetic testing.  They are aware that the MSAFP-4 is a screening test.  A screening test is not a diagnostic test.  This means that a negative test does not guarantee an unaffected fetus and a positive test does not mean the fetus has the condition for which the test is being performed.  If the test returns positive, a diagnostic test should be consider to determine if the fetus in fact has the condition.  After considering the options previously presented, she is still undecided if she is interested in having genetic testing performed.

## 2018-02-19 ENCOUNTER — TELEPHONE (OUTPATIENT)
Dept: OBSTETRICS AND GYNECOLOGY | Facility: CLINIC | Age: 23
End: 2018-02-19

## 2018-02-19 DIAGNOSIS — O26.852 SPOTTING AFFECTING PREGNANCY IN SECOND TRIMESTER: Primary | ICD-10-CM

## 2018-02-19 NOTE — TELEPHONE ENCOUNTER
Her blood type is AB+  Let's get her in for an ultrasound to look into the spotting.    Ordered has been placed

## 2018-02-19 NOTE — TELEPHONE ENCOUNTER
Michi pt-14 weeks pregnant c/o cramping on lower left side around ovary area and having spotting when wiping. Please advise pt what she needs to do

## 2018-02-21 ENCOUNTER — DOCUMENTATION (OUTPATIENT)
Dept: OBSTETRICS AND GYNECOLOGY | Facility: CLINIC | Age: 23
End: 2018-02-21

## 2018-02-21 DIAGNOSIS — O99.332 TOBACCO SMOKING AFFECTING PREGNANCY IN SECOND TRIMESTER: ICD-10-CM

## 2018-02-21 DIAGNOSIS — Z34.82 PRENATAL CARE, SUBSEQUENT PREGNANCY IN SECOND TRIMESTER: ICD-10-CM

## 2018-02-21 DIAGNOSIS — O43.102 PLACENTAL ABNORMALITY IN SECOND TRIMESTER: ICD-10-CM

## 2018-02-21 NOTE — PROGRESS NOTES
After receiving U/S report, I reviewed the images.  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  Will discuss with her at f/u visit.  May benefit from AFP testing to help to delineate etiology

## 2018-03-12 ENCOUNTER — ROUTINE PRENATAL (OUTPATIENT)
Dept: OBSTETRICS AND GYNECOLOGY | Facility: CLINIC | Age: 23
End: 2018-03-12

## 2018-03-12 ENCOUNTER — APPOINTMENT (OUTPATIENT)
Dept: LAB | Facility: HOSPITAL | Age: 23
End: 2018-03-12

## 2018-03-12 VITALS — WEIGHT: 203 LBS | DIASTOLIC BLOOD PRESSURE: 68 MMHG | SYSTOLIC BLOOD PRESSURE: 110 MMHG | BODY MASS INDEX: 29.98 KG/M2

## 2018-03-12 DIAGNOSIS — O43.102 PLACENTAL ABNORMALITY IN SECOND TRIMESTER: ICD-10-CM

## 2018-03-12 DIAGNOSIS — Z34.82 PRENATAL CARE, SUBSEQUENT PREGNANCY IN SECOND TRIMESTER: Primary | ICD-10-CM

## 2018-03-12 PROBLEM — Z30.2 REQUEST FOR STERILIZATION: Status: ACTIVE | Noted: 2018-03-12

## 2018-03-12 LAB
2ND TRIMESTER 4 SCREEN SERPL-IMP: NORMAL
HCV AB SER DONR QL: NORMAL

## 2018-03-12 PROCEDURE — 99213 OFFICE O/P EST LOW 20 MIN: CPT | Performed by: OBSTETRICS & GYNECOLOGY

## 2018-03-12 PROCEDURE — 86803 HEPATITIS C AB TEST: CPT | Performed by: OBSTETRICS & GYNECOLOGY

## 2018-03-12 PROCEDURE — 36415 COLL VENOUS BLD VENIPUNCTURE: CPT | Performed by: OBSTETRICS & GYNECOLOGY

## 2018-03-12 NOTE — PROGRESS NOTES
Chief Complaint   Patient presents with   • Routine Prenatal Visit       HPI: Alicia is a  currently at 16w5d who today reports the following:  Contractions - No; Leaking - No; Vaginal bleeding -  No; Heartburn - No.  Wants hep C screening    ROS:  GI: Nausea - No ; Constipation - No; Diarrhea - No    Neuro: Headache - No ; Visual change - No      EXAM:  Vitals: See prenatal flowsheet   Abdomen: See prenatal flowsheet   Urine glucose/protein: See prenatal flowsheet   Pelvic: See prenatal flowsheet     Lab Results   Component Value Date    ABORH AB Rh Positive 03/15/2016    ABO AB 2018    RH Positive 2018    LABANTI Negative 2015    ABSCRN Negative 2018       MDM:  Impression: 1. Supervision of high risk pregnancy  2. Tobacco use in pregnancy   Tests done today: 1. Hep C AB   Topics discussed: 1. Continue with PNV's  2. Prenatal labs reviewed  3. placental finds from last U/S   Tests scheduled today for her next visit:   U/S for anatomic screening   Next visit: See prenatal flowsheet

## 2018-03-13 ENCOUNTER — TELEPHONE (OUTPATIENT)
Dept: OBSTETRICS AND GYNECOLOGY | Facility: CLINIC | Age: 23
End: 2018-03-13

## 2018-03-13 NOTE — TELEPHONE ENCOUNTER
Provider Name  NIDIA  Reason for Call  RETURNING CALL TO NURSE REGARDING RESULTS  Pharmacy Name  [facility] IN [place], [place]  Call Back Number  [phone]

## 2018-03-16 DIAGNOSIS — Z34.82 PRENATAL CARE, SUBSEQUENT PREGNANCY IN SECOND TRIMESTER: Primary | ICD-10-CM

## 2018-03-16 DIAGNOSIS — O43.102 PLACENTAL ABNORMALITY IN SECOND TRIMESTER: ICD-10-CM

## 2018-03-19 ENCOUNTER — TELEPHONE (OUTPATIENT)
Dept: OBSTETRICS AND GYNECOLOGY | Facility: CLINIC | Age: 23
End: 2018-03-19

## 2018-03-19 NOTE — TELEPHONE ENCOUNTER
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

## 2018-03-19 NOTE — TELEPHONE ENCOUNTER
Provider Name  Michi    Reason for Call  Pt is 17 weeks and having difficulty working - being on her feet etc..She states her work will let you sit down without a doctor's note She would a call regarding work restictions.    [facility]    Call Back Number  [phone]

## 2018-03-19 NOTE — TELEPHONE ENCOUNTER
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  I would not at this point recommend she be restricted 2 jobs that require sitting.  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

## 2018-03-23 ENCOUNTER — OFFICE VISIT (OUTPATIENT)
Dept: RETAIL CLINIC | Facility: CLINIC | Age: 23
End: 2018-03-23

## 2018-03-23 VITALS
OXYGEN SATURATION: 98 % | HEART RATE: 83 BPM | SYSTOLIC BLOOD PRESSURE: 112 MMHG | RESPIRATION RATE: 20 BRPM | HEIGHT: 67 IN | WEIGHT: 201.6 LBS | TEMPERATURE: 98.6 F | DIASTOLIC BLOOD PRESSURE: 62 MMHG | BODY MASS INDEX: 31.64 KG/M2

## 2018-03-23 DIAGNOSIS — J00 COMMON COLD: Primary | ICD-10-CM

## 2018-03-23 PROCEDURE — 99213 OFFICE O/P EST LOW 20 MIN: CPT | Performed by: NURSE PRACTITIONER

## 2018-03-23 NOTE — PROGRESS NOTES
Subjective   Alicia Rey is a 22 y.o. female.     History of Present Illness   Patient presents with left ear pain and mild congestion that has santos present for over a week. She is in her second trimester of pregnancy. She denies fever, sore throat or cough but does have mild nasal congestion.   The following portions of the patient's history were reviewed and updated as appropriate: allergies, current medications, past family history, past medical history, past surgical history and problem list.    Review of Systems   Constitutional: Negative.    HENT: Positive for congestion (nasal), ear pain (left), postnasal drip and rhinorrhea. Negative for ear discharge, sinus pain, sinus pressure, sore throat, trouble swallowing and voice change.    Eyes: Negative.    Respiratory: Positive for cough (rare to occasionally).    Cardiovascular: Negative.    Musculoskeletal: Negative.    Skin: Negative.    Allergic/Immunologic: Negative.    Neurological: Negative.        Objective   Physical Exam   Constitutional: She is oriented to person, place, and time. Vital signs are normal. She appears well-developed and well-nourished.   HENT:   Head: Normocephalic and atraumatic.   Right Ear: Hearing, tympanic membrane, external ear and ear canal normal.   Left Ear: Hearing, external ear and ear canal normal. A middle ear effusion is present.   Nose: Mucosal edema and rhinorrhea present. Right sinus exhibits no maxillary sinus tenderness and no frontal sinus tenderness. Left sinus exhibits no maxillary sinus tenderness and no frontal sinus tenderness.   Mouth/Throat: Oropharynx is clear and moist and mucous membranes are normal. No oropharyngeal exudate, posterior oropharyngeal edema, posterior oropharyngeal erythema or tonsillar abscesses. Tonsils are 0 on the right. Tonsils are 0 on the left. No tonsillar exudate.   Eyes: Conjunctivae and EOM are normal. Pupils are equal, round, and reactive to light.   Cardiovascular: Normal rate,  regular rhythm and normal heart sounds.  Exam reveals no gallop and no friction rub.    No murmur heard.  Pulmonary/Chest: Effort normal and breath sounds normal. No stridor. No respiratory distress. She has no wheezes. She has no rales.   Neurological: She is alert and oriented to person, place, and time.   Skin: Skin is warm and dry. No rash noted. No erythema.   Psychiatric: She has a normal mood and affect. Her behavior is normal.   Nursing note and vitals reviewed.      Assessment/Plan   Alicia was seen today for earache.    Diagnoses and all orders for this visit:    Common cold      CASSI Gonzalez

## 2018-04-02 ENCOUNTER — HOSPITAL ENCOUNTER (OUTPATIENT)
Facility: HOSPITAL | Age: 23
Setting detail: OBSERVATION
Discharge: HOME | End: 2018-04-03
Attending: OBSTETRICS & GYNECOLOGY | Admitting: OBSTETRICS & GYNECOLOGY

## 2018-04-02 VITALS
RESPIRATION RATE: 20 BRPM | SYSTOLIC BLOOD PRESSURE: 126 MMHG | WEIGHT: 203 LBS | HEART RATE: 75 BPM | HEIGHT: 69 IN | TEMPERATURE: 99.2 F | DIASTOLIC BLOOD PRESSURE: 62 MMHG | BODY MASS INDEX: 30.07 KG/M2

## 2018-04-02 LAB
BACTERIA UR QL AUTO: ABNORMAL /HPF
BILIRUB UR QL STRIP: NEGATIVE
BILIRUB UR QL STRIP: NEGATIVE
CLARITY UR: ABNORMAL
CLARITY UR: CLEAR
COLOR UR: YELLOW
COLOR UR: YELLOW
GLUCOSE UR STRIP-MCNC: NEGATIVE MG/DL
GLUCOSE UR STRIP-MCNC: NEGATIVE MG/DL
HGB UR QL STRIP.AUTO: ABNORMAL
HGB UR QL STRIP.AUTO: NEGATIVE
HYALINE CASTS UR QL AUTO: ABNORMAL /LPF
KETONES UR QL STRIP: NEGATIVE
KETONES UR QL STRIP: NEGATIVE
LEUKOCYTE ESTERASE UR QL STRIP.AUTO: ABNORMAL
LEUKOCYTE ESTERASE UR QL STRIP.AUTO: NEGATIVE
NITRITE UR QL STRIP: NEGATIVE
NITRITE UR QL STRIP: NEGATIVE
PH UR STRIP.AUTO: 5.5 [PH] (ref 5–8)
PH UR STRIP.AUTO: <=5 [PH] (ref 5–8)
PROT UR QL STRIP: NEGATIVE
PROT UR QL STRIP: NEGATIVE
RBC # UR: ABNORMAL /HPF
REF LAB TEST METHOD: ABNORMAL
SP GR UR STRIP: 1.01 (ref 1–1.03)
SP GR UR STRIP: 1.02 (ref 1–1.03)
SQUAMOUS #/AREA URNS HPF: ABNORMAL /HPF
UROBILINOGEN UR QL STRIP: ABNORMAL
UROBILINOGEN UR QL STRIP: NORMAL
WBC UR QL AUTO: ABNORMAL /HPF

## 2018-04-02 PROCEDURE — 87086 URINE CULTURE/COLONY COUNT: CPT | Performed by: OBSTETRICS & GYNECOLOGY

## 2018-04-02 PROCEDURE — 81001 URINALYSIS AUTO W/SCOPE: CPT | Performed by: OBSTETRICS & GYNECOLOGY

## 2018-04-02 PROCEDURE — G0378 HOSPITAL OBSERVATION PER HR: HCPCS

## 2018-04-02 PROCEDURE — 81003 URINALYSIS AUTO W/O SCOPE: CPT | Performed by: OBSTETRICS & GYNECOLOGY

## 2018-04-03 PROBLEM — O46.90 BLEEDING FROM GENITAL TRACT DURING PREGNANCY: Status: ACTIVE | Noted: 2018-04-03

## 2018-04-03 PROCEDURE — G0378 HOSPITAL OBSERVATION PER HR: HCPCS

## 2018-04-03 PROCEDURE — 99218 PR INITIAL OBSERVATION CARE/DAY 30 MINUTES: CPT | Performed by: OBSTETRICS & GYNECOLOGY

## 2018-04-03 RX ORDER — NICOTINE 21 MG/24HR
1 PATCH, TRANSDERMAL 24 HOURS TRANSDERMAL ONCE
Status: DISCONTINUED | OUTPATIENT
Start: 2018-04-03 | End: 2018-04-03

## 2018-04-03 RX ORDER — NICOTINE 21 MG/24HR
1 PATCH, TRANSDERMAL 24 HOURS TRANSDERMAL EVERY 24 HOURS
Status: DISCONTINUED | OUTPATIENT
Start: 2018-04-03 | End: 2018-04-03

## 2018-04-03 NOTE — H&P
"Alicia Rey  1995  [id number]  [id number]    CC: bleeding  HPI:  Patient is 22 y.o. white female   currently at 19w6d  Presents with c/o ?vaginal bleeding.  Onset yesterday, sees only with wiping.  Some assoc cramping, rates 2-3/10, non-radiating, denies assoc V,D.  Does have some N.  PNC comp by tob use, mult SAb's.  Good FM.  No recent intercourse.    PMH:   Current meds PNV  Illnesses pleurisy,borderline personality d/o  Surgeries oral surg, D and C  Allergies wellbutrin- rash     Gabapentin- anger     Depakote- GI problems     Naproxen- rash    Past OB History:       Obstetric History       T2      L2     SAB5   TAB1   Ectopic0   Molar0   Multiple0   Live Births2       # Outcome Date GA Lbr Gerald/2nd Weight Sex Delivery Anes PTL Lv   9 Current            8 Term 03/15/16 41w0d  3175 g (7 lb) M Vag-Spont   LUISA      Name: Star Mayorga   7 Term 01/06/15   2977 g (6 lb 9 oz) F Vag-Spont   LUISA      Name: Iris   6 TAB            5 SAB            4 SAB            3 SAB            2 SAB            1 SAB               Obstetric Comments   2015 - [facility]   2016 - [facility]            SH: tob <5cig/d , EtOH neg, drugs neg  FH: heart dz pos , diabetes pos , cancer pos    General ROS: All systems reviewed and negative except for HA, N      Physical Examination: General appearance - alert, well appearing, and in no distress  Vital signs - /62   Pulse 75   Temp 99.2 °F (37.3 °C) (Oral)   Resp 20   Ht 175.3 cm (69\")   Wt 92.1 kg (203 lb)   LMP 11/15/2017 (Exact Date)   BMI 29.98 kg/m²   HEENT: normocephalic, atraumatic, pharynx clear   Neck - supple, no significant adenopathy  Lymphatics - no palpable lymphadenopathy, no hepatosplenomegaly  Chest - clear to auscultation, no wheezes, rales or rhonchi, symmetric air entry  Heart - normal rate, regular rhythm, normal S1, S2, no murmurs, rubs, clicks or gallops, no JVD  Abdomen - soft, nontender, nondistended, no masses or organomegaly  no " rebound tenderness noted, bowel sounds normal  Vaginal Exam: cl/th/ballot, no blood in vagina  Extremities - no pedal edema noted, no calf tend  Skin - normal coloration and turgor, no rashes, no suspicious skin lesions noted        Fetal monitoring: FHT's 150-160, no detectable contractions    Radiology US:active fetus, normal fluid, breech, ant placenta with no evid of retroplacental clot    Assessment 1)IUP 19 6/7 weeks     2)bleeding- likely urethral (ccUA neg, no blood in vagina, only sees with wiping)   3)tobacco use    4)obesity    Plan 1)observe     2)home     3)keep next sched appt    Sharif Parks MD  4/3/2018  12:09 AM

## 2018-04-04 LAB
BACTERIA SPEC AEROBE CULT: NORMAL
BACTERIA SPEC AEROBE CULT: NORMAL

## 2018-04-12 ENCOUNTER — ROUTINE PRENATAL (OUTPATIENT)
Dept: OBSTETRICS AND GYNECOLOGY | Facility: CLINIC | Age: 23
End: 2018-04-12

## 2018-04-12 ENCOUNTER — OFFICE VISIT (OUTPATIENT)
Dept: OBSTETRICS AND GYNECOLOGY | Facility: HOSPITAL | Age: 23
End: 2018-04-12

## 2018-04-12 ENCOUNTER — HOSPITAL ENCOUNTER (OUTPATIENT)
Dept: WOMENS IMAGING | Facility: HOSPITAL | Age: 23
Discharge: HOME | End: 2018-04-12
Attending: OBSTETRICS & GYNECOLOGY | Admitting: OBSTETRICS & GYNECOLOGY

## 2018-04-12 VITALS — BODY MASS INDEX: 30.27 KG/M2 | SYSTOLIC BLOOD PRESSURE: 101 MMHG | DIASTOLIC BLOOD PRESSURE: 59 MMHG | WEIGHT: 205 LBS

## 2018-04-12 DIAGNOSIS — Z34.82 PRENATAL CARE, SUBSEQUENT PREGNANCY IN SECOND TRIMESTER: ICD-10-CM

## 2018-04-12 DIAGNOSIS — O20.0 THREATENED ABORTION: Primary | ICD-10-CM

## 2018-04-12 DIAGNOSIS — N96 RECURRENT PREGNANCY LOSS (CODE): ICD-10-CM

## 2018-04-12 DIAGNOSIS — O44.20 MARGINAL PLACENTA PREVIA: ICD-10-CM

## 2018-04-12 DIAGNOSIS — Z34.82 PRENATAL CARE, SUBSEQUENT PREGNANCY IN SECOND TRIMESTER: Primary | ICD-10-CM

## 2018-04-12 DIAGNOSIS — O43.102 PLACENTAL ABNORMALITY IN SECOND TRIMESTER: ICD-10-CM

## 2018-04-12 DIAGNOSIS — Z82.79 FAMILY HISTORY OF CONGENITAL HEART DEFECT: ICD-10-CM

## 2018-04-12 DIAGNOSIS — Z30.2 REQUEST FOR STERILIZATION: ICD-10-CM

## 2018-04-12 PROBLEM — O46.90 BLEEDING FROM GENITAL TRACT DURING PREGNANCY: Status: RESOLVED | Noted: 2018-04-03 | Resolved: 2018-04-12

## 2018-04-12 PROCEDURE — 76811 OB US DETAILED SNGL FETUS: CPT

## 2018-04-12 PROCEDURE — 76811 OB US DETAILED SNGL FETUS: CPT | Performed by: OBSTETRICS & GYNECOLOGY

## 2018-04-12 PROCEDURE — 99213 OFFICE O/P EST LOW 20 MIN: CPT | Performed by: OBSTETRICS & GYNECOLOGY

## 2018-04-12 NOTE — PROGRESS NOTES
"Pt denies vaginal bleeding/fluid leakage currently.  Reports \"did have spotting and was here at [facility] 10 days ago but none since.\"  To see OB next.  Declined genetic screening tests.  "

## 2018-04-12 NOTE — PROGRESS NOTES
Chief Complaint   Patient presents with   • Routine Prenatal Visit       HPI: Alicia is a  currently at 21w1d who today reports the following:  Contractions - No; Leaking - No; Vaginal bleeding -  No; Heartburn - No.    ROS:  GI: Nausea - No ; Constipation - No; Diarrhea - No    Neuro: Headache - No ; Visual change - No      EXAM:  Vitals: See prenatal flowsheet   Abdomen: See prenatal flowsheet   Urine glucose/protein: See prenatal flowsheet   Pelvic: See prenatal flowsheet     Lab Results   Component Value Date    ABORH AB Rh Positive 03/15/2016    ABO AB 2018    RH Positive 2018    LABANTI Negative 2015    ABSCRN Negative 2018       MDM:  Impression: 1. Supervision of high risk pregnancy  2. Tobacco use in pregnancy   Tests done today: 1. U/S for anatomic screening - anatomy completely seen today at [facility]   Topics discussed: 1. Continue with PNV's  2. Prenatal labs reviewed  3. tobacco use   Tests scheduled today for her next visit:   none   Next visit: See prenatal flowsheet

## 2018-04-13 ENCOUNTER — TELEPHONE (OUTPATIENT)
Dept: OBSTETRICS AND GYNECOLOGY | Facility: CLINIC | Age: 23
End: 2018-04-13

## 2018-04-13 NOTE — TELEPHONE ENCOUNTER
Provider Name  Dr Borden    Reason for Call  21.2 weeks pregnant, wants to know if Dr Borden could call in Zoloft, depression is worse lately    Pharmacy Name  [facility] in [place], [place]      Call Back Number [phone]

## 2018-04-17 ENCOUNTER — HOSPITAL ENCOUNTER (OUTPATIENT)
Facility: HOSPITAL | Age: 23
Setting detail: OBSERVATION
Discharge: HOME | End: 2018-04-17
Attending: OBSTETRICS & GYNECOLOGY | Admitting: OBSTETRICS & GYNECOLOGY

## 2018-04-17 VITALS
HEIGHT: 67 IN | BODY MASS INDEX: 32.18 KG/M2 | SYSTOLIC BLOOD PRESSURE: 125 MMHG | RESPIRATION RATE: 16 BRPM | DIASTOLIC BLOOD PRESSURE: 69 MMHG | WEIGHT: 205 LBS | HEART RATE: 80 BPM | TEMPERATURE: 97.9 F

## 2018-04-17 PROBLEM — Z34.90 PREGNANCY: Status: ACTIVE | Noted: 2018-04-17

## 2018-04-17 LAB
BILIRUB UR QL STRIP: NEGATIVE
CLARITY UR: CLEAR
COLOR UR: YELLOW
GLUCOSE UR STRIP-MCNC: NEGATIVE MG/DL
HGB UR QL STRIP.AUTO: NEGATIVE
KETONES UR QL STRIP: NEGATIVE
LEUKOCYTE ESTERASE UR QL STRIP.AUTO: NEGATIVE
NITRITE UR QL STRIP: NEGATIVE
PH UR STRIP.AUTO: 6.5 [PH] (ref 5–8)
PROT UR QL STRIP: NEGATIVE
SP GR UR STRIP: 1.01 (ref 1–1.03)
UROBILINOGEN UR QL STRIP: NORMAL

## 2018-04-17 PROCEDURE — 99218 PR INITIAL OBSERVATION CARE/DAY 30 MINUTES: CPT | Performed by: OBSTETRICS & GYNECOLOGY

## 2018-04-17 PROCEDURE — 81003 URINALYSIS AUTO W/O SCOPE: CPT | Performed by: OBSTETRICS & GYNECOLOGY

## 2018-04-17 PROCEDURE — G0378 HOSPITAL OBSERVATION PER HR: HCPCS
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
## 2018-04-17 NOTE — NURSING NOTE
All d/c teaching reviewed with patient. All questions are answered. Pt is able to v/u. D/c'd to home in good condition with friends

## 2018-05-06 ENCOUNTER — HOSPITAL ENCOUNTER (OUTPATIENT)
Facility: HOSPITAL | Age: 23
Setting detail: OBSERVATION
Discharge: HOME | End: 2018-05-06
Attending: OBSTETRICS & GYNECOLOGY | Admitting: OBSTETRICS & GYNECOLOGY

## 2018-05-06 VITALS
HEIGHT: 67 IN | RESPIRATION RATE: 16 BRPM | DIASTOLIC BLOOD PRESSURE: 71 MMHG | BODY MASS INDEX: 32.18 KG/M2 | HEART RATE: 88 BPM | SYSTOLIC BLOOD PRESSURE: 134 MMHG | WEIGHT: 205 LBS | TEMPERATURE: 98.5 F

## 2018-05-06 PROCEDURE — 99218 PR INITIAL OBSERVATION CARE/DAY 30 MINUTES: CPT | Performed by: OBSTETRICS & GYNECOLOGY

## 2018-05-06 PROCEDURE — G0378 HOSPITAL OBSERVATION PER HR: HCPCS

## 2018-05-06 PROCEDURE — 59025 FETAL NON-STRESS TEST: CPT
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
## 2018-05-06 NOTE — NURSING NOTE
Discussed discharge instructions with patient. Advised patient to return to labor and delivery if decreased fetal movement, vaginal bleeding, leaking of fluid or regular contractions lasting 5-7 min consistently. Advised patient to keep scheduled appointment with Dr. Borden on Thursday. Patient verbalized understanding. Patient walked off unit with all belongings and friend by her side.

## 2018-05-10 ENCOUNTER — ROUTINE PRENATAL (OUTPATIENT)
Dept: OBSTETRICS AND GYNECOLOGY | Facility: CLINIC | Age: 23
End: 2018-05-10

## 2018-05-10 VITALS — DIASTOLIC BLOOD PRESSURE: 70 MMHG | SYSTOLIC BLOOD PRESSURE: 110 MMHG | WEIGHT: 213 LBS | BODY MASS INDEX: 33.36 KG/M2

## 2018-05-10 DIAGNOSIS — Z84.89 FAMILY HISTORY OF GENETIC DISEASE: ICD-10-CM

## 2018-05-10 DIAGNOSIS — Z30.2 REQUEST FOR STERILIZATION: ICD-10-CM

## 2018-05-10 DIAGNOSIS — Z34.82 PRENATAL CARE, SUBSEQUENT PREGNANCY IN SECOND TRIMESTER: Primary | ICD-10-CM

## 2018-05-10 PROCEDURE — 99213 OFFICE O/P EST LOW 20 MIN: CPT | Performed by: OBSTETRICS & GYNECOLOGY

## 2018-05-10 NOTE — PROGRESS NOTES
Chief Complaint   Patient presents with   • Routine Prenatal Visit       HPI: Alicia is a  currently at 25w1d who today reports the following:  Contractions - No; Leaking - No; Vaginal bleeding -  No; Heartburn - No.    ROS:  GI: Nausea - No ; Constipation - No; Diarrhea - No    Neuro: Headache - No ; Visual change - No      EXAM:  Vitals: See prenatal flowsheet   Abdomen: See prenatal flowsheet   Urine glucose/protein: See prenatal flowsheet   Pelvic: See prenatal flowsheet     Lab Results   Component Value Date    ABORH AB Rh Positive 03/15/2016    ABO AB 2018    RH Positive 2018    LABANTI Negative 2015    ABSCRN Negative 2018       MDM:  Impression: 1. Supervision of low risk pregnancy  2. FHx of diGeorge and SCAD - PDC recommends genetic testing   Tests done today: 1. none   Topics discussed: 1. Continue with PNV's  2. Prenatal labs reviewed  3. none - she had no major complaints,questions or concerns   Tests scheduled today for her next visit:   GCT  HgB

## 2018-05-12 ENCOUNTER — OFFICE VISIT (OUTPATIENT)
Dept: RETAIL CLINIC | Facility: CLINIC | Age: 23
End: 2018-05-12

## 2018-05-12 VITALS
WEIGHT: 210.8 LBS | SYSTOLIC BLOOD PRESSURE: 128 MMHG | HEART RATE: 107 BPM | DIASTOLIC BLOOD PRESSURE: 76 MMHG | TEMPERATURE: 97 F | RESPIRATION RATE: 18 BRPM | BODY MASS INDEX: 33.09 KG/M2 | OXYGEN SATURATION: 97 % | HEIGHT: 67 IN

## 2018-05-12 DIAGNOSIS — J40 BRONCHITIS: Primary | ICD-10-CM

## 2018-05-12 DIAGNOSIS — J02.9 SORE THROAT: ICD-10-CM

## 2018-05-12 DIAGNOSIS — H65.03 BILATERAL ACUTE SEROUS OTITIS MEDIA, RECURRENCE NOT SPECIFIED: ICD-10-CM

## 2018-05-12 PROCEDURE — 87880 STREP A ASSAY W/OPTIC: CPT | Performed by: NURSE PRACTITIONER

## 2018-05-12 PROCEDURE — 99213 OFFICE O/P EST LOW 20 MIN: CPT | Performed by: NURSE PRACTITIONER

## 2018-05-12 RX ORDER — ACETAMINOPHEN 325 MG/1
325 TABLET ORAL EVERY 6 HOURS PRN
Qty: 40 TABLET | Refills: 0 | Status: SHIPPED | OUTPATIENT
Start: 2018-05-12 | End: 2018-05-22

## 2018-05-12 RX ORDER — GUAIFENESIN 600 MG/1
1200 TABLET, EXTENDED RELEASE ORAL 2 TIMES DAILY PRN
Qty: 40 TABLET | Refills: 0 | Status: SHIPPED | OUTPATIENT
Start: 2018-05-12 | End: 2018-05-22

## 2018-05-12 RX ORDER — DEXTROMETHORPHAN HYDROBROMIDE AND PROMETHAZINE HYDROCHLORIDE 15; 6.25 MG/5ML; MG/5ML
5 SYRUP ORAL NIGHTLY PRN
Qty: 118 ML | Refills: 0 | Status: SHIPPED | OUTPATIENT
Start: 2018-05-12 | End: 2018-05-17

## 2018-05-12 RX ORDER — AZITHROMYCIN 250 MG/1
TABLET, FILM COATED ORAL
Qty: 6 TABLET | Refills: 0 | Status: SHIPPED | OUTPATIENT
Start: 2018-05-12 | End: 2018-05-24

## 2018-05-12 NOTE — PATIENT INSTRUCTIONS
Acetaminophen, per package directions, as needed for fever > 100, mild pain  Drink plenty of clear, decaffeinated fluids, as tolerated.      Acute Bronchitis, Adult  Acute bronchitis is when air tubes (bronchi) in the lungs suddenly get swollen. The condition can make it hard to breathe. It can also cause these symptoms:  · A cough.  · Coughing up clear, yellow, or green mucus.  · Wheezing.  · Chest congestion.  · Shortness of breath.  · A fever.  · Body aches.  · Chills.  · A sore throat.  Follow these instructions at home:  Medicines   · Take over-the-counter and prescription medicines only as told by your doctor.  · If you were prescribed an antibiotic medicine, take it as told by your doctor. Do not stop taking the antibiotic even if you start to feel better.  General instructions   · Rest.  · Drink enough fluids to keep your pee (urine) clear or pale yellow.  · Avoid smoking and secondhand smoke. If you smoke and you need help quitting, ask your doctor. Quitting will help your lungs heal faster.  · Use an inhaler, cool mist vaporizer, or humidifier as told by your doctor.  · Keep all follow-up visits as told by your doctor. This is important.  How is this prevented?  To lower your risk of getting this condition again:  · Wash your hands often with soap and water. If you cannot use soap and water, use hand .  · Avoid contact with people who have cold symptoms.  · Try not to touch your hands to your mouth, nose, or eyes.  · Make sure to get the flu shot every year.  Contact a doctor if:  · Your symptoms do not get better in 2 weeks.  Get help right away if:  · You cough up blood.  · You have chest pain.  · You have very bad shortness of breath.  · You become dehydrated.  · You faint (pass out) or keep feeling like you are going to pass out.  · You keep throwing up (vomiting).  · You have a very bad headache.  · Your fever or chills gets worse.  This information is not intended to replace advice given to  you by your health care provider. Make sure you discuss any questions you have with your health care provider.  Document Released: 06/05/2009 Document Revised: 07/26/2017 Document Reviewed: 06/07/2017  [employer] Patient Education © 2017 [employer].  Otitis Media, Adult  Otitis media is redness, soreness, and puffiness (swelling) in the space just behind your eardrum (middle ear). It may be caused by allergies or infection. It often happens along with a cold.  Follow these instructions at home:  · Take your medicine as told. Finish it even if you start to feel better.  · Only take over-the-counter or prescription medicines for pain, discomfort, or fever as told by your doctor.  · Follow up with your doctor as told.  Contact a doctor if:  · You have otitis media only in one ear, or bleeding from your nose, or both.  · You notice a lump on your neck.  · You are not getting better in 3-5 days.  · You feel worse instead of better.  Get help right away if:  · You have pain that is not helped with medicine.  · You have puffiness, redness, or pain around your ear.  · You get a stiff neck.  · You cannot move part of your face (paralysis).  · You notice that the bone behind your ear hurts when you touch it.  This information is not intended to replace advice given to you by your health care provider. Make sure you discuss any questions you have with your health care provider.  Document Released: 06/05/2009 Document Revised: 05/25/2017 Document Reviewed: 07/15/2014  [employer] Interactive Patient Education © 2017 [employer]

## 2018-05-12 NOTE — PROGRESS NOTES
Subjective   Alicia Rey is a 23 y.o. female , 25 weeks pregnant.  CHIEF COMPLAINT  Sore Throat (URI symptoms)      Sore Throat    This is a new problem. The current episode started in the past 7 days. The problem has been gradually worsening. Neither side of throat is experiencing more pain than the other. There has been no fever. The pain is at a severity of 7/10. Associated symptoms include congestion, coughing, ear pain, a plugged ear sensation and neck pain. Pertinent negatives include no abdominal pain, diarrhea, drooling, ear discharge, headaches, hoarse voice, shortness of breath, stridor, swollen glands, trouble swallowing or vomiting. She has tried cool liquids (cough drops) for the symptoms. The treatment provided no relief.       The following portions of the patient's history were reviewed and updated as appropriate: allergies, current mediations, past family history, past medical history, past social history, past surgical history, and problem list.    Review of Systems   Constitutional: Positive for appetite change and fatigue. Negative for chills and fever.   HENT: Positive for congestion, ear pain, rhinorrhea, sneezing and sore throat. Negative for drooling, ear discharge, hoarse voice, tinnitus and trouble swallowing.    Eyes: Negative.    Respiratory: Positive for cough. Negative for shortness of breath, wheezing and stridor.    Gastrointestinal: Positive for constipation. Negative for abdominal pain, diarrhea, nausea and vomiting.   Musculoskeletal: Positive for neck pain.   Skin: Negative for rash.   Neurological: Negative for dizziness, light-headedness and headaches.         Objective   Allergies   Allergen Reactions   • Bactrim [Sulfamethoxazole-Trimethoprim] Nausea And Vomiting   • Ciprofloxacin Hcl Nausea And Vomiting and GI Intolerance   • Naproxen Hives   • Depakote [Divalproex Sodium] GI Intolerance   • Gabapentin Rash and Irritability   • Latex Rash   • Tomato Hives   • Wellbutrin  "[Bupropion] Rash and Irritability     \"anger outbursts\"         /76   Pulse 107   Temp 97 °F (36.1 °C) (Temporal Artery )   Resp 18   Ht 170.2 cm (67\")   Wt 95.6 kg (210 lb 12.8 oz)   LMP 11/15/2017 (Exact Date)   SpO2 97%   BMI 33.02 kg/m²     Physical Exam   Constitutional: She is oriented to person, place, and time. She appears well-developed and well-nourished. She appears distressed.   HENT:   Head: Normocephalic.   Right Ear: External ear and ear canal normal. A middle ear effusion is present.   Left Ear: External ear and ear canal normal. A middle ear effusion is present.   Nose: Mucosal edema present. No sinus tenderness.   Mouth/Throat: Uvula is midline. Posterior oropharyngeal erythema present. No oropharyngeal exudate or posterior oropharyngeal edema.   Eyes: Conjunctivae are normal.   Cardiovascular: Regular rhythm.  Tachycardia present.    Pulmonary/Chest: Effort normal. She has rhonchi (clears with cough).   Lymphadenopathy:     She has no cervical adenopathy.   Neurological: She is alert and oriented to person, place, and time.       Assessment/Plan   Alicia was seen today for sore throat.    Diagnoses and all orders for this visit:    Bronchitis  -     azithromycin (ZITHROMAX Z-ANTHONY) 250 MG tablet; Take 2 tablets the first day, then 1 tablet daily for 4 days.  -     promethazine-dextromethorphan (PROMETHAZINE-DM) 6.25-15 MG/5ML syrup; Take 5 mL by mouth At Night As Needed for Cough for up to 5 days. Advised this will cause drowsiness.  -     guaiFENesin (MUCINEX) 600 MG 12 hr tablet; Take 2 tablets by mouth 2 (Two) Times a Day As Needed for Cough or Congestion for up to 10 days.  - Called OB/GYN while in clinic to verify these meds are safe to take during her pregnancy: all meds approved  - Drink plenty of clear, decaffeinated fluids, as tolerated.    Bilateral acute serous otitis media, recurrence not specified  -     guaiFENesin (MUCINEX) 600 MG 12 hr tablet; Take 2 tablets by mouth 2 " (Two) Times a Day As Needed for Cough or Congestion for up to 10 days.  -     acetaminophen (TYLENOL) 325 MG tablet; Take 1 tablet by mouth Every 6 (Six) Hours As Needed for Mild Pain  for up to 10 days.  - Acetaminophen or ibuprofen, per package directions, as needed for mild pain, fever > 100    Sore throat  -     POC Rapid Strep A: negative     An After Visit Summary was printed, reviewed, and given to the patient. Understanding verbalized and agrees with treatment plan.  If no improvement or becomes worse, follow up with primary or go to [facility]/ER.        May 13, 2018  11:10 AM

## 2018-05-13 LAB
EXPIRATION DATE: NORMAL
INTERNAL CONTROL: NORMAL
Lab: NORMAL
S PYO AG THROAT QL: NEGATIVE

## 2018-05-14 ENCOUNTER — TELEPHONE (OUTPATIENT)
Dept: OBSTETRICS AND GYNECOLOGY | Facility: CLINIC | Age: 23
End: 2018-05-14

## 2018-05-14 NOTE — TELEPHONE ENCOUNTER
Per Dr. Borden the medication is safe to take, Pt states the mucinex is causing her to cry I had avised Pt to stop taking the mucinex and call was forward to the front to get Pt schedule

## 2018-05-14 NOTE — TELEPHONE ENCOUNTER
Dr. Borden Pt    Pt is about 25 weeks and is very concerned about the safety prescription medication she is currently taking for Bronchitis specifically - Mucinex and Promethazine - Dextromethorphan.    Callback [phone]    [facility] [place]

## 2018-05-15 ENCOUNTER — ROUTINE PRENATAL (OUTPATIENT)
Dept: OBSTETRICS AND GYNECOLOGY | Facility: CLINIC | Age: 23
End: 2018-05-15

## 2018-05-15 VITALS — WEIGHT: 210 LBS | BODY MASS INDEX: 32.89 KG/M2 | SYSTOLIC BLOOD PRESSURE: 116 MMHG | DIASTOLIC BLOOD PRESSURE: 74 MMHG

## 2018-05-15 DIAGNOSIS — Z34.82 PRENATAL CARE, SUBSEQUENT PREGNANCY IN SECOND TRIMESTER: ICD-10-CM

## 2018-05-15 DIAGNOSIS — J40 BRONCHITIS: Primary | ICD-10-CM

## 2018-05-15 DIAGNOSIS — Z30.2 REQUEST FOR STERILIZATION: ICD-10-CM

## 2018-05-15 PROCEDURE — 99213 OFFICE O/P EST LOW 20 MIN: CPT | Performed by: OBSTETRICS & GYNECOLOGY

## 2018-05-15 RX ORDER — ALBUTEROL SULFATE 90 UG/1
2 AEROSOL, METERED RESPIRATORY (INHALATION) EVERY 4 HOURS PRN
Qty: 8 G | Refills: 1 | Status: SHIPPED | OUTPATIENT
Start: 2018-05-15 | End: 2019-03-14

## 2018-05-15 NOTE — PROGRESS NOTES
Chief Complaint   Patient presents with   • Pregnancy Problem       HPI: Alicia is a  currently at 25w6d who today reports the following:  She was just recently at the emergency room [facility].  She went because of some breathing issues.  They did a chest x-ray which showed minimal pneumonia.  She had been on azithromycin in the outpatient setting for a couple days prior to this.  He made the recommendation to start her on oral prednisone.  They refused to give her any inhalers and told her she needed to come see me in follow-up because of the breathing issues.  Her pulse oximeter she thinks was about 95%.  Other than the oral steroids and did not give her anything.  They did not recommend any change in her antibiotic therapy.    She does still continue to smoke but has diminished the number of cigarettes per day    ROS:  GI: Nausea - No ; Constipation - No; Diarrhea - YES    Neuro: Headache - No ; Visual change - No      EXAM:  Vitals: See prenatal flowsheet   Abdomen: See prenatal flowsheet   Urine glucose/protein: See prenatal flowsheet   Pelvic: See prenatal flowsheet     Lab Results   Component Value Date    ABORH AB Rh Positive 03/15/2016    ABO AB 2018    RH Positive 2018    LABANTI Negative 2015    ABSCRN Negative 2018       MDM:  Impression: 1. Supervision of low risk pregnancy  2. Upper respiratory infection most consistent with bronchitic with asthmatic exacerbation   Tests done today: 1. none   Topics discussed: 1. Continue with PNV's  2. Prenatal labs reviewed  3. It is okay for her to use an inhaler.  In fact it is preferred using inhaler as compared to oral steroids   4. A prescription for an inhaler will be sent to her pharmacy.  She strongly encouraged to follow up with her primary care physician related to any breathing issues that she may have  5. I also strongly encouraged her should've future problem arise to come to [facility] for evaluation.  I  cannot access the records through [facility] and fetal evaluation and [facility] facility is unavailable.    6. Needs to quit cigarettes!!!!!     New Medications Ordered This Visit   Medications   • albuterol (PROVENTIL HFA;VENTOLIN HFA) 108 (90 Base) MCG/ACT inhaler     Sig: Inhale 2 puffs Every 4 (Four) Hours As Needed for Wheezing.     Dispense:  8 g     Refill:  1

## 2018-05-24 ENCOUNTER — APPOINTMENT (OUTPATIENT)
Dept: LAB | Facility: HOSPITAL | Age: 23
End: 2018-05-24

## 2018-05-24 ENCOUNTER — TELEPHONE (OUTPATIENT)
Dept: OBSTETRICS AND GYNECOLOGY | Facility: CLINIC | Age: 23
End: 2018-05-24

## 2018-05-24 ENCOUNTER — HOSPITAL ENCOUNTER (OUTPATIENT)
Dept: CARDIOLOGY | Facility: HOSPITAL | Age: 23
Discharge: HOME | End: 2018-05-24
Attending: OBSTETRICS & GYNECOLOGY | Admitting: OBSTETRICS & GYNECOLOGY

## 2018-05-24 ENCOUNTER — ROUTINE PRENATAL (OUTPATIENT)
Dept: OBSTETRICS AND GYNECOLOGY | Facility: CLINIC | Age: 23
End: 2018-05-24

## 2018-05-24 VITALS — SYSTOLIC BLOOD PRESSURE: 132 MMHG | DIASTOLIC BLOOD PRESSURE: 80 MMHG | BODY MASS INDEX: 33.14 KG/M2 | WEIGHT: 211.6 LBS

## 2018-05-24 DIAGNOSIS — Z34.82 PRENATAL CARE, SUBSEQUENT PREGNANCY IN SECOND TRIMESTER: Primary | ICD-10-CM

## 2018-05-24 DIAGNOSIS — O99.332 TOBACCO SMOKING AFFECTING PREGNANCY IN SECOND TRIMESTER: ICD-10-CM

## 2018-05-24 DIAGNOSIS — M79.604 RIGHT LEG PAIN: ICD-10-CM

## 2018-05-24 PROBLEM — J40 BRONCHITIS: Status: RESOLVED | Noted: 2018-05-15 | Resolved: 2018-05-24

## 2018-05-24 LAB
BH CV ECHO MEAS - BSA(HAYCOCK): 2.2 M^2
BH CV ECHO MEAS - BSA: 2.1 M^2
BH CV ECHO MEAS - BZI_BMI: 33 KILOGRAMS/M^2
BH CV ECHO MEAS - BZI_METRIC_HEIGHT: 170.2 CM
BH CV ECHO MEAS - BZI_METRIC_WEIGHT: 95.7 KG
BH CV LOWER VASCULAR LEFT COMMON FEMORAL AUGMENT: NORMAL
BH CV LOWER VASCULAR LEFT COMMON FEMORAL COMPRESS: NORMAL
BH CV LOWER VASCULAR LEFT COMMON FEMORAL PHASIC: NORMAL
BH CV LOWER VASCULAR LEFT COMMON FEMORAL SPONT: NORMAL
BH CV LOWER VASCULAR LEFT SAPHENOFEMORAL JUNCTION AUGMENT: NORMAL
BH CV LOWER VASCULAR LEFT SAPHENOFEMORAL JUNCTION COMPRESS: NORMAL
BH CV LOWER VASCULAR LEFT SAPHENOFEMORAL JUNCTION PHASIC: NORMAL
BH CV LOWER VASCULAR LEFT SAPHENOFEMORAL JUNCTION SPONT: NORMAL
BH CV LOWER VASCULAR RIGHT COMMON FEMORAL AUGMENT: NORMAL
BH CV LOWER VASCULAR RIGHT COMMON FEMORAL COMPRESS: NORMAL
BH CV LOWER VASCULAR RIGHT COMMON FEMORAL PHASIC: NORMAL
BH CV LOWER VASCULAR RIGHT COMMON FEMORAL SPONT: NORMAL
BH CV LOWER VASCULAR RIGHT DISTAL FEMORAL AUGMENT: NORMAL
BH CV LOWER VASCULAR RIGHT DISTAL FEMORAL COMPRESS: NORMAL
BH CV LOWER VASCULAR RIGHT DISTAL FEMORAL PHASIC: NORMAL
BH CV LOWER VASCULAR RIGHT DISTAL FEMORAL SPONT: NORMAL
BH CV LOWER VASCULAR RIGHT GASTRONEMIUS COMPRESS: NORMAL
BH CV LOWER VASCULAR RIGHT GREATER SAPH AK COMPRESS: NORMAL
BH CV LOWER VASCULAR RIGHT GREATER SAPH BK COMPRESS: NORMAL
BH CV LOWER VASCULAR RIGHT LESSER SAPH COMPRESS: NORMAL
BH CV LOWER VASCULAR RIGHT MID FEMORAL AUGMENT: NORMAL
BH CV LOWER VASCULAR RIGHT MID FEMORAL COMPRESS: NORMAL
BH CV LOWER VASCULAR RIGHT MID FEMORAL PHASIC: NORMAL
BH CV LOWER VASCULAR RIGHT MID FEMORAL SPONT: NORMAL
BH CV LOWER VASCULAR RIGHT PERONEAL AUGMENT: NORMAL
BH CV LOWER VASCULAR RIGHT PERONEAL COMPRESS: NORMAL
BH CV LOWER VASCULAR RIGHT POPLITEAL AUGMENT: NORMAL
BH CV LOWER VASCULAR RIGHT POPLITEAL COMPRESS: NORMAL
BH CV LOWER VASCULAR RIGHT POPLITEAL PHASIC: NORMAL
BH CV LOWER VASCULAR RIGHT POPLITEAL SPONT: NORMAL
BH CV LOWER VASCULAR RIGHT POSTERIOR TIBIAL AUGMENT: NORMAL
BH CV LOWER VASCULAR RIGHT POSTERIOR TIBIAL COMPRESS: NORMAL
BH CV LOWER VASCULAR RIGHT PROXIMAL FEMORAL AUGMENT: NORMAL
BH CV LOWER VASCULAR RIGHT PROXIMAL FEMORAL COMPRESS: NORMAL
BH CV LOWER VASCULAR RIGHT PROXIMAL FEMORAL PHASIC: NORMAL
BH CV LOWER VASCULAR RIGHT PROXIMAL FEMORAL SPONT: NORMAL
BH CV LOWER VASCULAR RIGHT SAPHENOFEMORAL JUNCTION AUGMENT: NORMAL
BH CV LOWER VASCULAR RIGHT SAPHENOFEMORAL JUNCTION COMPRESS: NORMAL
BH CV LOWER VASCULAR RIGHT SAPHENOFEMORAL JUNCTION PHASIC: NORMAL
BH CV LOWER VASCULAR RIGHT SAPHENOFEMORAL JUNCTION SPONT: NORMAL
GLUCOSE 1H P 100 G GLC PO SERPL-MCNC: 137 MG/DL (ref 65–140)
HCT VFR BLD AUTO: 36.3 % (ref 34.5–44)
HGB BLD-MCNC: 12.3 G/DL (ref 11.5–15.5)

## 2018-05-24 PROCEDURE — 85014 HEMATOCRIT: CPT | Performed by: OBSTETRICS & GYNECOLOGY

## 2018-05-24 PROCEDURE — 93971 EXTREMITY STUDY: CPT

## 2018-05-24 PROCEDURE — 36415 COLL VENOUS BLD VENIPUNCTURE: CPT | Performed by: OBSTETRICS & GYNECOLOGY

## 2018-05-24 PROCEDURE — 99213 OFFICE O/P EST LOW 20 MIN: CPT | Performed by: OBSTETRICS & GYNECOLOGY

## 2018-05-24 PROCEDURE — 85018 HEMOGLOBIN: CPT | Performed by: OBSTETRICS & GYNECOLOGY

## 2018-05-24 PROCEDURE — 93971 EXTREMITY STUDY: CPT | Performed by: INTERNAL MEDICINE

## 2018-05-24 PROCEDURE — 82950 GLUCOSE TEST: CPT | Performed by: OBSTETRICS & GYNECOLOGY

## 2018-05-24 NOTE — PROGRESS NOTES
Chief Complaint   Patient presents with   • Routine Prenatal Visit     Swelling in legs and feet       HPI: Alicia is a  currently at 27w1d who today reports the following:  Contractions - No; Leaking - No; Vaginal bleeding -  No; Heartburn - No.  Breathing is much better with the MDI  New onset of right leg pain from feet up to the mid thigh    ROS:  GI: Nausea - No ; Constipation - No; Diarrhea - No    Neuro: Headache - No ; Visual change - No      EXAM:  Vitals: See prenatal flowsheet   Abdomen: See prenatal flowsheet   Urine glucose/protein: See prenatal flowsheet   Pelvic: See prenatal flowsheet     Lab Results   Component Value Date    ABORH AB Rh Positive 03/15/2016    ABO AB 2018    RH Positive 2018    LABANTI Negative 2015    ABSCRN Negative 2018       MDM:  Impression: 1. Supervision of high risk pregnancy  2. Tobacco use in pregnancy  3. Right leg pain - This is a new finding that does need to be worked up further   Tests done today: 1. GCT  2. HgB   Topics discussed: 1. Continue with PNV's  2. Prenatal labs reviewed  3. set up U/S for DVT   Tests scheduled today for her next visit:   none

## 2018-05-24 NOTE — TELEPHONE ENCOUNTER
Central scheduling called Pt with the appointment time for the venous doppler of 1:30 today.  Pt stated that she wasn't sure she would be able to make the appointment due to lack of gas money.  Pt was told that it was important to keep the appointment.

## 2018-06-10 ENCOUNTER — HOSPITAL ENCOUNTER (EMERGENCY)
Facility: HOSPITAL | Age: 23
Discharge: HOME | End: 2018-06-10
Attending: EMERGENCY MEDICINE | Admitting: EMERGENCY MEDICINE

## 2018-06-10 VITALS
TEMPERATURE: 98.6 F | WEIGHT: 213 LBS | SYSTOLIC BLOOD PRESSURE: 138 MMHG | DIASTOLIC BLOOD PRESSURE: 77 MMHG | HEIGHT: 69 IN | BODY MASS INDEX: 31.55 KG/M2 | RESPIRATION RATE: 20 BRPM | HEART RATE: 101 BPM | OXYGEN SATURATION: 100 %

## 2018-06-10 DIAGNOSIS — R07.9 NONSPECIFIC CHEST PAIN: ICD-10-CM

## 2018-06-10 DIAGNOSIS — R06.00 DYSPNEA, UNSPECIFIED TYPE: Primary | ICD-10-CM

## 2018-06-10 PROCEDURE — 99283 EMERGENCY DEPT VISIT LOW MDM: CPT

## 2018-06-10 PROCEDURE — 93005 ELECTROCARDIOGRAM TRACING: CPT | Performed by: EMERGENCY MEDICINE

## 2018-06-11 NOTE — DISCHARGE INSTRUCTIONS
Follow up with your OBGYN, Dr. Borden in 3 days.    Follow up with your primary care provider, Dr. Trejo, within 1 week.    Replace the filters on your HVAC system as soon as possible as directed.    Return to the ER if you experience any new or worsening symptoms.
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
## 2018-06-13 ENCOUNTER — CLINICAL SUPPORT (OUTPATIENT)
Dept: GENETICS | Facility: HOSPITAL | Age: 23
End: 2018-06-13

## 2018-06-13 ENCOUNTER — APPOINTMENT (OUTPATIENT)
Dept: LAB | Facility: HOSPITAL | Age: 23
End: 2018-06-13

## 2018-06-13 DIAGNOSIS — Z82.79 FAMILY HISTORY OF CONGENITAL OR GENETIC CONDITION: Primary | ICD-10-CM

## 2018-06-13 DIAGNOSIS — Z13.79 GENETIC TESTING: Primary | ICD-10-CM

## 2018-06-13 DIAGNOSIS — Z13.71 SCREENING FOR GENETIC DISEASE CARRIER STATUS: ICD-10-CM

## 2018-06-13 PROCEDURE — 96040: CPT | Performed by: GENETIC COUNSELOR, MS

## 2018-06-14 ENCOUNTER — ROUTINE PRENATAL (OUTPATIENT)
Dept: OBSTETRICS AND GYNECOLOGY | Facility: CLINIC | Age: 23
End: 2018-06-14

## 2018-06-14 VITALS — BODY MASS INDEX: 32.05 KG/M2 | DIASTOLIC BLOOD PRESSURE: 80 MMHG | SYSTOLIC BLOOD PRESSURE: 128 MMHG | WEIGHT: 217 LBS

## 2018-06-14 DIAGNOSIS — Z34.83 PRENATAL CARE, SUBSEQUENT PREGNANCY IN THIRD TRIMESTER: Primary | ICD-10-CM

## 2018-06-14 DIAGNOSIS — O99.333 TOBACCO SMOKING AFFECTING PREGNANCY IN THIRD TRIMESTER: ICD-10-CM

## 2018-06-14 PROCEDURE — 99213 OFFICE O/P EST LOW 20 MIN: CPT | Performed by: OBSTETRICS & GYNECOLOGY

## 2018-06-14 NOTE — PROGRESS NOTES
Chief Complaint   Patient presents with   • Routine Prenatal Visit       HPI: Alicia is a  currently at 30w1d who today reports the following:  Contractions - No; Leaking - No; Vaginal bleeding -  No; Swelling of extremities - No.    ROS:  GI: Nausea - No; Constipation - No; Diarrhea - No    Neuro: Headache - No; Visual change - No      EXAM:  Vitals: See prenatal flowsheet   Abdomen: See prenatal flowsheet   Urine glucose/protein: See prenatal flowsheet   Pelvic: See prenatal flowsheet   MDM:   Impression: 1. Supervision of high risk pregnancy  2. Tobacco use in pregnancy   Tests done today: 1. none   Topics discussed: 1. Continue with PNV's  2. Prenatal labs reviewed  3. none - she had no major complaints,questions or concerns   Tests scheduled today for her next visit:   none

## 2018-06-14 NOTE — PROGRESS NOTES
Alicia Rey is a 23-year-old  female who was seen for genetic counseling. Ms. Rey has a family history of 22q11.2 deletion syndrome and Short-chain acyl-CoA dehydrogenase deficiency (SCAD deficiency). She was interested in discussing her chance of having a child with SCAD deficiency, as well as carrier screening to evaluate risks for potential future pregnancies. Ms. Rey opted to pursue carrier testing using the [employer] Carrier Screen.     Pregnancy and Family History: We reviewed Ms. Rey’s family and medical history in detail. She is currently 30 weeks pregnant with an estimated delivery date of 2018. She has a three-year-old daughter and a two-year-old son. She reports that both children, especially her son, show symptoms of SCAD deficiency, including muscle weakness and excessive diarrhea. Ms. Rey has a history of six miscarriages, one of which occurred with the father of her current pregnancy. Ms. Rey’s mother has a history of three miscarriages with two partners. She has a maternal half-sister who reportedly has 22q11.2 deletion syndrome, intellectual disability, and SCAD deficiency. Her maternal half-brother reportedly has autism and SCAD deficiency. She has a paternal half-brother with intellectual disability. Both family histories were otherwise negative for birth defects, intellectual disability, individuals with multiple miscarriages, or other genetic disorders.    Genetic Counseling: (60 minutes) 22q11.2 deletion syndrome, also known as DiGeorge syndrome, is caused by a small deletion on chromosome 22. The condition is characterized by variable expressivity, even within the same family, and is characterized by congenital heart defects, cleft palate, and distinctive facial features. Developmental delay, intellectual disabilities, and anxiety are also common in affected individuals. When discussing the etiology of the condition we emphasized that the majority of  cases, about 93%, are sporadic, meaning it is not inherited from a parent. To determine if the condition is inherited, it is most appropriate for parents of an affected individual to have genetic testing prior to testing other family members. When it is inherited, 22q11.2 deletion syndrome follows an autosomal dominant inheritance pattern, meaning only one parent must carry the deletion on chromosome 22 in order for their children to be at risk. Children and siblings of an individual believed to carry this deletion have a 50% chance of inheriting that deletion, thereby inheriting the condition. These deletions can be passed down from the maternal or the paternal lineage.    Short-chain acyl-CoA dehydrogenase deficiency (SCAD deficiency) prevents the body from making energy from certain fats. Symptoms may include vomiting, low blood sugar, low muscle tone, difficulty gaining weight, lack of energy, and poor feeding. These and other symptoms may be triggered by periods of fasting. Severity of symptoms varies widely between individuals. Approximately 1 in 160  individuals are a carrier of SCAD deficiency. It is inherited in an autosomal recessive manner, meaning that both parents must carry mutations in the ACADS gene (the gene that causes SCAD deficiency) in order for their children to be at risk. If both parents are carriers, there is a 1 in 4 chance that each child will have SCAD deficiency, a 1 in 4 chance that each child will be a healthy non-carrier, and a 1 in 2 chance that each child will be a healthy carrier.    Ms. Rey reported that some of her family members have had MTHFR testing and was interested in discussing MTHFR deficiency. Therefore, we reviewed the current information on MTHFR deficiency. It follows an autosomal recessive inheritance pattern, meaning that an individual must inherit two variants, one in each copy of MTHFR, in order to be at risk of having related symptoms. Approximately  45% of the  population is heterozygous or a carrier of an MTHFR variant while 10-20% of the general population is homozygous or compound heterozygous for two variants in the MTHFR gene.  These are very common variants in the general population, and the vast majority of individuals who carry these are unaffected by the variants. According to the literature and a consensus statement from [employer] (ACMG), in the absence of elevated homocysteine levels, MTHFR variants appear to have no clinical relevance.  ACMG recommends against testing relatives since MTHFR carrier status alone is not considered clinically significant.      We also briefly discussed Ms. Rey’s family history of breast cancer, which includes a maternal aunt diagnosed with breast cancer at 18 and her maternal grandmother diagnosed with breast cancer in her 30s. Ms. Rey plans to discuss this family history with her family and is encouraged to contact us with any new information she learns about her family history. The importance of testing on an affected relative was emphasized. In cases where an affected relative is not available for testing or not willing to pursue testing, it is appropriate to offer testing to an unaffected individual. We discussed that Ms. Rey, as well as her mother, could be referred to genetic counseling in the future to discuss genetic testing options, family history-based risk assessment, and appropriate breast screening approach. This assessment is based on the information provided at time of consultation and could change in the future should new information be obtained.     Genetic Testing: We discussed that there is carrier testing available for over 175 recessive and X-linked conditions. Individuals who are carriers for these disorders typically do not have a family history of these disorders, as the majority of the conditions on the panel are inherited in an autosomal recessive  pattern. Most people are carriers for several genetic diseases, but these carriers do not exhibit any symptoms of the disease. If a couple both carry the same disease, there is a 1 in 4 (25%) chance that they will have a child affected with the disease. The diseases on the panel range in severity from mild to severe. Ms. Rey was interested in pursuing testing to determine whether she was at risk to have a child with one of the genetic diseases on this panel, including SCAD deficiency.     Plan: Ms. Rey opted to pursue carrier testing via the [employer] Carrier Screen, which provides sequencing of DNA regions associated with 175 hereditary conditions. Results are expected in 2-3 weeks at which time I will contact Ms. Rey by telephone to discuss.       Mary Ann Stallings MS, [facility], [facility]  Licensed Certified Genetic Counselor

## 2018-06-27 ENCOUNTER — HOSPITAL ENCOUNTER (OUTPATIENT)
Facility: HOSPITAL | Age: 23
Discharge: HOME | End: 2018-06-28
Attending: OBSTETRICS & GYNECOLOGY | Admitting: OBSTETRICS & GYNECOLOGY

## 2018-06-27 VITALS
HEART RATE: 64 BPM | SYSTOLIC BLOOD PRESSURE: 126 MMHG | RESPIRATION RATE: 18 BRPM | HEIGHT: 68 IN | BODY MASS INDEX: 33.34 KG/M2 | DIASTOLIC BLOOD PRESSURE: 57 MMHG | TEMPERATURE: 98.5 F | WEIGHT: 220 LBS

## 2018-06-27 LAB
BACTERIA UR QL AUTO: ABNORMAL /HPF
BASOPHILS # BLD AUTO: 0.01 10*3/MM3 (ref 0–0.2)
BASOPHILS NFR BLD AUTO: 0.1 % (ref 0–1)
BILIRUB UR QL STRIP: NEGATIVE
CLARITY UR: ABNORMAL
COLOR UR: ABNORMAL
DEPRECATED RDW RBC AUTO: 41.7 FL (ref 37–54)
EOSINOPHIL # BLD AUTO: 0.05 10*3/MM3 (ref 0–0.3)
EOSINOPHIL NFR BLD AUTO: 0.4 % (ref 0–3)
ERYTHROCYTE [DISTWIDTH] IN BLOOD BY AUTOMATED COUNT: 13.1 % (ref 11.3–14.5)
GLUCOSE UR STRIP-MCNC: NEGATIVE MG/DL
HCT VFR BLD AUTO: 34.7 % (ref 34.5–44)
HGB BLD-MCNC: 11.5 G/DL (ref 11.5–15.5)
HGB UR QL STRIP.AUTO: ABNORMAL
HYALINE CASTS UR QL AUTO: ABNORMAL /LPF
IMM GRANULOCYTES # BLD: 0.06 10*3/MM3 (ref 0–0.03)
IMM GRANULOCYTES NFR BLD: 0.5 % (ref 0–0.6)
KETONES UR QL STRIP: NEGATIVE
LEUKOCYTE ESTERASE UR QL STRIP.AUTO: ABNORMAL
LYMPHOCYTES # BLD AUTO: 2.27 10*3/MM3 (ref 0.6–4.8)
LYMPHOCYTES NFR BLD AUTO: 20.2 % (ref 24–44)
MCH RBC QN AUTO: 28.8 PG (ref 27–31)
MCHC RBC AUTO-ENTMCNC: 33.1 G/DL (ref 32–36)
MCV RBC AUTO: 86.8 FL (ref 80–99)
MONOCYTES # BLD AUTO: 0.83 10*3/MM3 (ref 0–1)
MONOCYTES NFR BLD AUTO: 7.4 % (ref 0–12)
NEUTROPHILS # BLD AUTO: 8.05 10*3/MM3 (ref 1.5–8.3)
NEUTROPHILS NFR BLD AUTO: 71.9 % (ref 41–71)
NITRITE UR QL STRIP: NEGATIVE
PH UR STRIP.AUTO: 7 [PH] (ref 5–8)
PLATELET # BLD AUTO: 272 10*3/MM3 (ref 150–450)
PMV BLD AUTO: 10 FL (ref 6–12)
PROT UR QL STRIP: ABNORMAL
RBC # BLD AUTO: 4 10*6/MM3 (ref 3.89–5.14)
RBC # UR: ABNORMAL /HPF
REF LAB TEST METHOD: ABNORMAL
SP GR UR STRIP: <=1.005 (ref 1–1.03)
SQUAMOUS #/AREA URNS HPF: ABNORMAL /HPF
UROBILINOGEN UR QL STRIP: ABNORMAL
WBC NRBC COR # BLD: 11.21 10*3/MM3 (ref 3.5–10.8)
WBC UR QL AUTO: ABNORMAL /HPF

## 2018-06-27 PROCEDURE — G0463 HOSPITAL OUTPT CLINIC VISIT: HCPCS

## 2018-06-27 PROCEDURE — 59025 FETAL NON-STRESS TEST: CPT

## 2018-06-27 PROCEDURE — 85025 COMPLETE CBC W/AUTO DIFF WBC: CPT | Performed by: OBSTETRICS & GYNECOLOGY

## 2018-06-27 PROCEDURE — 81001 URINALYSIS AUTO W/SCOPE: CPT | Performed by: OBSTETRICS & GYNECOLOGY

## 2018-06-28 LAB
BILIRUB UR QL STRIP: NEGATIVE
CLARITY UR: CLEAR
COLOR UR: YELLOW
GLUCOSE UR STRIP-MCNC: NEGATIVE MG/DL
HGB UR QL STRIP.AUTO: NEGATIVE
KETONES UR QL STRIP: NEGATIVE
LEUKOCYTE ESTERASE UR QL STRIP.AUTO: NEGATIVE
NITRITE UR QL STRIP: NEGATIVE
PH UR STRIP.AUTO: 5.5 [PH] (ref 5–8)
PROT UR QL STRIP: NEGATIVE
SP GR UR STRIP: 1.02 (ref 1–1.03)
UROBILINOGEN UR QL STRIP: NORMAL

## 2018-06-28 PROCEDURE — 99214 OFFICE O/P EST MOD 30 MIN: CPT | Performed by: OBSTETRICS & GYNECOLOGY

## 2018-06-28 PROCEDURE — 81003 URINALYSIS AUTO W/O SCOPE: CPT | Performed by: OBSTETRICS & GYNECOLOGY

## 2018-07-05 ENCOUNTER — DOCUMENTATION (OUTPATIENT)
Dept: GENETICS | Facility: HOSPITAL | Age: 23
End: 2018-07-05

## 2018-07-05 ENCOUNTER — TELEPHONE (OUTPATIENT)
Dept: OBSTETRICS AND GYNECOLOGY | Facility: CLINIC | Age: 23
End: 2018-07-05

## 2018-07-05 NOTE — PROGRESS NOTES
Alicia Rey is a 23-year-old  female who was seen for genetic counseling. Ms. Rey has a family history of 22q11.2 deletion syndrome and Short-chain acyl-CoA dehydrogenase deficiency (SCAD deficiency). She was interested in discussing her chance of having a child with SCAD deficiency, as well as carrier screening to evaluate risks for potential future pregnancies. Ms. Rey opted to pursue carrier testing using the [employer] Carrier Screen.     Pregnancy and Family History: We reviewed Ms. Rey’s family and medical history in detail. She is currently 30 weeks pregnant with an estimated delivery date of 2018. She has a three-year-old daughter and a two-year-old son with a previous partner. She reports that both children, especially her son, show symptoms of SCAD deficiency, including muscle weakness and excessive diarrhea. Ms. Rey has a history of six miscarriages, one of which occurred with the father of her current pregnancy. Ms. Rey’s mother has a history of three miscarriages. Ms. Rey has a maternal half-sister who reportedly has 22q11.2 deletion syndrome, intellectual disability, and SCAD deficiency. Her maternal half-brother reportedly has autism and SCAD deficiency. She has a paternal half-brother with intellectual disability. Both family histories were otherwise negative for birth defects, intellectual disability, individuals with multiple miscarriages, or other genetic disorders.    Genetic Counselinq11.2 deletion syndrome, also known as DiGeorge syndrome, is caused by a small deletion on chromosome 22. The condition is characterized by variable expressivity, even within the same family, and is characterized by congenital heart defects, cleft palate, and distinctive facial features. Developmental delay, intellectual disabilities, and anxiety are also common in individuals with 22q11.2 deletion syndrome. When discussing the etiology of the condition we emphasized that  the majority of cases, about 93%, are sporadic, meaning it is not inherited from a parent. To determine if the condition is inherited, it is most appropriate for parents of an affected individual to have genetic testing prior to testing other family members. When it is inherited, 22q11.2 deletion syndrome follows an autosomal dominant inheritance pattern, meaning only one parent must carry the deletion on chromosome 22 in order for their children to be at risk. Children and siblings of an individual believed to carry this deletion have a 50% chance of inheriting that deletion, thereby inheriting the condition. These deletions can be passed down from the maternal or the paternal lineage.    Short-chain acyl-CoA dehydrogenase deficiency (SCAD deficiency) prevents the body from making energy from certain fats. Symptoms may include vomiting, low blood sugar, low muscle tone, difficulty gaining weight, lack of energy, and poor feeding. These and other symptoms may be triggered by periods of fasting. Severity of symptoms varies widely between individuals. Approximately 1 in 160  individuals are a carrier of SCAD deficiency. It is inherited in an autosomal recessive manner, meaning that both parents must carry mutations in the ACADS gene (the gene that causes SCAD deficiency) in order for their children to be at risk. If both parents are carriers, there is a 1 in 4 (25%) chance that each child will have SCAD deficiency, a 1 in 4 (25%) chance that each child will be a healthy non-carrier, and a 1 in 2 (50%) chance that each child will be a healthy carrier.    Ms. Rey reported that some of her family members have had MTHFR testing and was interested in discussing MTHFR deficiency. Therefore, we reviewed the current information on MTHFR deficiency. It follows an autosomal recessive inheritance pattern, meaning that an individual must inherit two variants, one in each copy of MTHFR, in order to be at risk of having  related symptoms. Approximately 45% of the  population is heterozygous or a carrier of an MTHFR variant while 10-20% of the general population is homozygous or compound heterozygous for two variants in the MTHFR gene.  These are very common variants in the general population, and the vast majority of individuals who carry these are unaffected by the variants. According to the literature and a consensus statement from [employer] (ACMG), in the absence of elevated homocysteine levels, MTHFR variants appear to have no clinical relevance.  ACMG recommends against testing relatives since MTHFR carrier status alone is not considered clinically significant.      We also briefly discussed Ms. Rey’s family history of breast cancer, which includes a maternal aunt diagnosed with breast cancer at 18 and her maternal grandmother diagnosed with breast cancer in her 30s. Ms. Rey plans to discuss this family history with her family and is encouraged to contact us with any new information she learns about her family history. The importance of testing on an affected relative was emphasized. In cases where an affected relative is not available for testing or not willing to pursue testing, it is appropriate to offer testing to an unaffected individual. We discussed that Ms. Rey, as well as her mother, could be referred to genetic counseling in the future to discuss genetic testing options, family history-based risk assessment, and appropriate breast screening approach. This assessment is based on the information provided at time of consultation and could change in the future should new information be obtained.     Genetic Testing: We discussed that there is carrier testing available for over 175 recessive and X-linked conditions. Individuals who are carriers for these disorders typically do not have a family history of these disorders, as the majority of the conditions on the panel are  inherited in an autosomal recessive pattern. Most people are carriers for several genetic diseases, but these carriers do not exhibit any symptoms of the disease. If a couple both carry the same disease, there is a 1 in 4 (25%) chance that they will have a child affected with the disease. The diseases on the panel range in severity from mild to severe. Ms. Rey was interested in pursuing testing to determine whether she was at risk to have a child with one of the genetic diseases on this panel, including SCAD deficiency.     Results: Ms. Rey opted to pursue carrier screening using the [employer] Carrier Screen, which provided sequencing of DNA regions associated with 176 hereditary conditions, including SCAD deficiency. Ms. Rey’s testing was negative for mutations in any of the genes includes on the [employer] carrier screening panel, greatly reducing the likelihood that she is a carrier for SCAD deficiency or any other condition on this panel (see attached results). We discussed that although Ms. Rey’s mother is an obligate carrier of SCAD deficiency, genetic testing is available to her to determine if she has an identifiable mutation. Without knowledge/confirmation of the familial mutation, carrier status cannot be definitively ruled out. Ms. Rey’s partner has not undergone carrier screening, therefore his carrier status is unknown at this time. Carrier screening is available to Ms. Rey’s partner to clarify reproductive risks for the couple. Based on these results, the risk for Ms. Rey to have a child with one of the disorders on the panel is exceedingly low. These normal results were discussed with Ms. Rey by telephone on 6/27/18.  Plan: Genetic counseling remains available to Ms. Rey. We encourage her to contact us with any questions she may have at [phone].       Alexandra Magnante  Genetic Counseling Student     Cc: Alicia Borden MD

## 2018-07-05 NOTE — TELEPHONE ENCOUNTER
Pt was called and will wait until tomorrow visit to see what is causing the pain from the bump on her thigh.

## 2018-07-05 NOTE — TELEPHONE ENCOUNTER
Provider Name  Dr Borden  Reason for Call  33 weeks pregnant, has bump on thigh, please call her  Pharmacy Name  [facility] [place]  Call Back Number  [phone]

## 2018-07-05 NOTE — TELEPHONE ENCOUNTER
Dr. Borden patient 33w1d  [phone] returned patient's call. Looks like she scheduled an appointment for tomorrow when she called and left her message.

## 2018-07-06 ENCOUNTER — ROUTINE PRENATAL (OUTPATIENT)
Dept: OBSTETRICS AND GYNECOLOGY | Facility: CLINIC | Age: 23
End: 2018-07-06

## 2018-07-06 VITALS — SYSTOLIC BLOOD PRESSURE: 120 MMHG | DIASTOLIC BLOOD PRESSURE: 76 MMHG | WEIGHT: 222 LBS | BODY MASS INDEX: 33.75 KG/M2

## 2018-07-06 DIAGNOSIS — N76.4 VULVAR ABSCESS: Primary | ICD-10-CM

## 2018-07-06 DIAGNOSIS — O99.333 TOBACCO SMOKING AFFECTING PREGNANCY IN THIRD TRIMESTER: ICD-10-CM

## 2018-07-06 DIAGNOSIS — Z34.83 PRENATAL CARE, SUBSEQUENT PREGNANCY IN THIRD TRIMESTER: ICD-10-CM

## 2018-07-06 PROCEDURE — 99213 OFFICE O/P EST LOW 20 MIN: CPT | Performed by: OBSTETRICS & GYNECOLOGY

## 2018-07-06 RX ORDER — CEPHALEXIN 500 MG/1
500 CAPSULE ORAL 4 TIMES DAILY
Qty: 40 CAPSULE | Refills: 0 | Status: SHIPPED | OUTPATIENT
Start: 2018-07-06 | End: 2018-07-16

## 2018-07-06 NOTE — PROGRESS NOTES
Chief Complaint   Patient presents with   • Routine Prenatal Visit       HPI: Alicia is a  currently at 33w2d who today reports the following:  Contractions - No; Leaking - No; Vaginal bleeding -  No; Swelling of extremities - No.   In the left vulvar region she's had a progressive swelling of a red tender knot.  She's not tried lancet.  She is not yet seen a come to a head.  She has tried to use some warm compresses which has not helped a lot    ROS:  GI: Nausea - No; Constipation - No; Diarrhea - No    Neuro: Headache - No; Visual change - No      EXAM:  Vitals: See prenatal flowsheet   Abdomen: See prenatal flowsheet   Urine glucose/protein: See prenatal flowsheet   Pelvic: In the left sanchez-vulva near the inguinal crease there is a 3-4 cm red raised indurated tender region.  There is no crepitus    MDM:   Impression: 1. Supervision of high risk pregnancy  2. Tobacco use in pregnancy  3. Left inguinal mass most consistent with superficial phlegmon.  Cannot exclude superficial abscess    Tests done today: 1. none   Topics discussed: 1. Continue with PNV's  2. Prenatal labs reviewed  3. tobacco use   4. If no change by early next week may need incision and drainage in the operating room    Tests scheduled today for her next visit:   none     New Medications Ordered This Visit   Medications   • cephalexin (KEFLEX) 500 MG capsule     Sig: Take 1 capsule by mouth 4 (Four) Times a Day for 10 days.     Dispense:  40 capsule     Refill:  0

## 2018-07-11 ENCOUNTER — ROUTINE PRENATAL (OUTPATIENT)
Dept: OBSTETRICS AND GYNECOLOGY | Facility: CLINIC | Age: 23
End: 2018-07-11

## 2018-07-11 VITALS — DIASTOLIC BLOOD PRESSURE: 76 MMHG | SYSTOLIC BLOOD PRESSURE: 122 MMHG | BODY MASS INDEX: 34.21 KG/M2 | WEIGHT: 225 LBS

## 2018-07-11 DIAGNOSIS — Z34.83 PRENATAL CARE, SUBSEQUENT PREGNANCY IN THIRD TRIMESTER: ICD-10-CM

## 2018-07-11 DIAGNOSIS — N76.4 VULVAR ABSCESS: ICD-10-CM

## 2018-07-11 PROCEDURE — 99213 OFFICE O/P EST LOW 20 MIN: CPT | Performed by: OBSTETRICS & GYNECOLOGY

## 2018-07-11 NOTE — PROGRESS NOTES
Chief Complaint   Patient presents with   • Routine Prenatal Visit       HPI: Alicia is a  currently at 34w0d who today reports the following:  Contractions - No; Leaking - No; Vaginal bleeding -  No; Swelling of extremities - No.  She is still taking her Keflex.  The vulvar abscess seems to be getting better.    She also wanted to let me know that there are 2 active CPS cases ongoing related to some concerns related to her other 2 children    ROS:  GI: Nausea - No; Constipation - No; Diarrhea - No    Neuro: Headache - No; Visual change - No      EXAM:  Vitals: See prenatal flowsheet   Abdomen: See prenatal flowsheet   Urine glucose/protein: See prenatal flowsheet   Pelvic: The left vulvar/inguinal area suspected to be an abscess is now 3 x 1.5 cm.  There is no erythema or crepitus    MDM:   Impression: 1. Supervision of high risk pregnancy  2. Vulvar lesion acting more like a phlegmon at this point   Tests done today: 1. none   Topics discussed: 1. Continue with PNV's  2. Prenatal labs reviewed  3. Complete 10 days of antibiotics as prescribed and reassess in 1 week's time   Tests scheduled today for her next visit:   none

## 2018-07-18 ENCOUNTER — TELEPHONE (OUTPATIENT)
Dept: OBSTETRICS AND GYNECOLOGY | Facility: CLINIC | Age: 23
End: 2018-07-18

## 2018-07-18 ENCOUNTER — HOSPITAL ENCOUNTER (OUTPATIENT)
Facility: HOSPITAL | Age: 23
Setting detail: OBSERVATION
Discharge: HOME | End: 2018-07-18
Attending: OBSTETRICS & GYNECOLOGY | Admitting: OBSTETRICS & GYNECOLOGY

## 2018-07-18 VITALS
HEART RATE: 100 BPM | TEMPERATURE: 98.8 F | DIASTOLIC BLOOD PRESSURE: 61 MMHG | SYSTOLIC BLOOD PRESSURE: 120 MMHG | WEIGHT: 220 LBS | HEIGHT: 68 IN | BODY MASS INDEX: 33.34 KG/M2 | RESPIRATION RATE: 16 BRPM

## 2018-07-18 PROBLEM — Z34.90 PREGNANCY: Status: ACTIVE | Noted: 2018-07-18

## 2018-07-18 LAB
ALP SERPL-CCNC: 170 U/L (ref 25–100)
ALT SERPL W P-5'-P-CCNC: 10 U/L (ref 7–40)
AST SERPL-CCNC: 13 U/L (ref 0–33)
BILIRUB SERPL-MCNC: 0.3 MG/DL (ref 0.3–1.2)
CREAT BLD-MCNC: 0.47 MG/DL (ref 0.6–1.3)
DEPRECATED RDW RBC AUTO: 40.8 FL (ref 37–54)
ERYTHROCYTE [DISTWIDTH] IN BLOOD BY AUTOMATED COUNT: 13 % (ref 11.3–14.5)
HCT VFR BLD AUTO: 33.5 % (ref 34.5–44)
HGB BLD-MCNC: 11 G/DL (ref 11.5–15.5)
LDH SERPL-CCNC: 157 U/L (ref 120–246)
MCH RBC QN AUTO: 28.1 PG (ref 27–31)
MCHC RBC AUTO-ENTMCNC: 32.8 G/DL (ref 32–36)
MCV RBC AUTO: 85.7 FL (ref 80–99)
PLATELET # BLD AUTO: 255 10*3/MM3 (ref 150–450)
PMV BLD AUTO: 9.8 FL (ref 6–12)
RBC # BLD AUTO: 3.91 10*6/MM3 (ref 3.89–5.14)
URATE SERPL-MCNC: 3.3 MG/DL (ref 3.1–7.8)
WBC NRBC COR # BLD: 12.26 10*3/MM3 (ref 3.5–10.8)

## 2018-07-18 PROCEDURE — 99218 PR INITIAL OBSERVATION CARE/DAY 30 MINUTES: CPT | Performed by: OBSTETRICS & GYNECOLOGY

## 2018-07-18 PROCEDURE — G0378 HOSPITAL OBSERVATION PER HR: HCPCS

## 2018-07-18 PROCEDURE — 84550 ASSAY OF BLOOD/URIC ACID: CPT | Performed by: OBSTETRICS & GYNECOLOGY

## 2018-07-18 PROCEDURE — 84460 ALANINE AMINO (ALT) (SGPT): CPT | Performed by: OBSTETRICS & GYNECOLOGY

## 2018-07-18 PROCEDURE — 83615 LACTATE (LD) (LDH) ENZYME: CPT | Performed by: OBSTETRICS & GYNECOLOGY

## 2018-07-18 PROCEDURE — 59025 FETAL NON-STRESS TEST: CPT

## 2018-07-18 PROCEDURE — 82565 ASSAY OF CREATININE: CPT | Performed by: OBSTETRICS & GYNECOLOGY

## 2018-07-18 PROCEDURE — 84075 ASSAY ALKALINE PHOSPHATASE: CPT | Performed by: OBSTETRICS & GYNECOLOGY

## 2018-07-18 PROCEDURE — 85027 COMPLETE CBC AUTOMATED: CPT | Performed by: OBSTETRICS & GYNECOLOGY

## 2018-07-18 PROCEDURE — 82247 BILIRUBIN TOTAL: CPT | Performed by: OBSTETRICS & GYNECOLOGY

## 2018-07-18 PROCEDURE — 84450 TRANSFERASE (AST) (SGOT): CPT | Performed by: OBSTETRICS & GYNECOLOGY
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
## 2018-07-18 NOTE — TELEPHONE ENCOUNTER
Called and spoke with her.  She is also feeling decreased fetal movement and things just don't seem right.  My advice was to go to labor and delivery tonight to make sure blood pressure was okay and fetal heart rate tracing was reassuring.

## 2018-07-19 ENCOUNTER — ROUTINE PRENATAL (OUTPATIENT)
Dept: OBSTETRICS AND GYNECOLOGY | Facility: CLINIC | Age: 23
End: 2018-07-19

## 2018-07-19 VITALS — SYSTOLIC BLOOD PRESSURE: 126 MMHG | BODY MASS INDEX: 34.82 KG/M2 | DIASTOLIC BLOOD PRESSURE: 80 MMHG | WEIGHT: 229 LBS

## 2018-07-19 DIAGNOSIS — O99.333 TOBACCO SMOKING AFFECTING PREGNANCY IN THIRD TRIMESTER: ICD-10-CM

## 2018-07-19 DIAGNOSIS — Z34.83 PRENATAL CARE, SUBSEQUENT PREGNANCY IN THIRD TRIMESTER: Primary | ICD-10-CM

## 2018-07-19 PROBLEM — N76.4 VULVAR ABSCESS: Status: RESOLVED | Noted: 2018-07-06 | Resolved: 2018-07-19

## 2018-07-19 LAB — GP B STREP RRNA SPEC QL PROBE: NEGATIVE

## 2018-07-19 PROCEDURE — 99213 OFFICE O/P EST LOW 20 MIN: CPT | Performed by: OBSTETRICS & GYNECOLOGY

## 2018-07-19 NOTE — PROGRESS NOTES
Chief Complaint   Patient presents with   • Routine Prenatal Visit       HPI: Alicia is a  currently at 35w1d who today reports the following:  Contractions - No; Leaking - No; Vaginal bleeding -  No; Swelling of extremities - No.  Vulvar area is about back to normal    ROS:  GI: Nausea - No; Constipation - No; Diarrhea - No    Neuro: Headache - No; Visual change - No      EXAM:  Vitals: See prenatal flowsheet   Abdomen: See prenatal flowsheet   Urine glucose/protein: See prenatal flowsheet   Pelvic: See prenatal flowsheet   MDM:   Impression: 1. Supervision of high risk pregnancy   2. Vulvar abscess - improved   Tests done today: 1. GBS testing   Topics discussed: 1. Continue with PNV's  2. Prenatal labs reviewed  3. none - she had no major complaints,questions or concerns   Tests scheduled today for her next visit:   none

## 2018-07-26 ENCOUNTER — ROUTINE PRENATAL (OUTPATIENT)
Dept: OBSTETRICS AND GYNECOLOGY | Facility: CLINIC | Age: 23
End: 2018-07-26

## 2018-07-26 VITALS — DIASTOLIC BLOOD PRESSURE: 82 MMHG | WEIGHT: 230 LBS | BODY MASS INDEX: 34.97 KG/M2 | SYSTOLIC BLOOD PRESSURE: 118 MMHG

## 2018-07-26 DIAGNOSIS — O99.333 TOBACCO SMOKING AFFECTING PREGNANCY IN THIRD TRIMESTER: ICD-10-CM

## 2018-07-26 DIAGNOSIS — Z34.83 PRENATAL CARE, SUBSEQUENT PREGNANCY IN THIRD TRIMESTER: ICD-10-CM

## 2018-07-26 PROCEDURE — 99213 OFFICE O/P EST LOW 20 MIN: CPT | Performed by: OBSTETRICS & GYNECOLOGY

## 2018-07-26 NOTE — PROGRESS NOTES
Chief Complaint   Patient presents with   • Routine Prenatal Visit       HPI: Alicia is a  currently at 36w1d who today reports the following:  Contractions - YES - but less than 4/hour AND no associated change in vaginal discharge; Leaking - No; Vaginal bleeding -  No; Swelling of extremities - No.    ROS:  GI: Nausea - No; Constipation - No; Diarrhea - No    Neuro: Headache - No; Visual change - No      EXAM:  Vitals: See prenatal flowsheet   Abdomen: See prenatal flowsheet   Urine glucose/protein: See prenatal flowsheet   Pelvic: See prenatal flowsheet   MDM:   Impression: 1. Supervision of low risk pregnancy   Tests done today: 1. none   Topics discussed: 1. Continue with PNV's  2. Prenatal labs reviewed  3. none - she had no major complaints,questions or concerns   Tests scheduled today for her next visit:   none

## 2018-08-02 ENCOUNTER — ROUTINE PRENATAL (OUTPATIENT)
Dept: OBSTETRICS AND GYNECOLOGY | Facility: CLINIC | Age: 23
End: 2018-08-02

## 2018-08-02 VITALS — SYSTOLIC BLOOD PRESSURE: 122 MMHG | WEIGHT: 234 LBS | DIASTOLIC BLOOD PRESSURE: 80 MMHG | BODY MASS INDEX: 35.58 KG/M2

## 2018-08-02 DIAGNOSIS — Z34.83 PRENATAL CARE, SUBSEQUENT PREGNANCY IN THIRD TRIMESTER: ICD-10-CM

## 2018-08-02 DIAGNOSIS — O99.333 TOBACCO SMOKING AFFECTING PREGNANCY IN THIRD TRIMESTER: ICD-10-CM

## 2018-08-02 PROCEDURE — 99213 OFFICE O/P EST LOW 20 MIN: CPT | Performed by: OBSTETRICS & GYNECOLOGY

## 2018-08-02 NOTE — PROGRESS NOTES
Chief Complaint   Patient presents with   • Routine Prenatal Visit       HPI: Alicia is a  currently at 37w1d who today reports the following:  Contractions - No; Leaking - No; Vaginal bleeding -  No; Swelling of extremities - No.    ROS:  GI: Nausea - No; Constipation - No; Diarrhea - No    Neuro: Headache - No; Visual change - No      EXAM:  Vitals: See prenatal flowsheet   Abdomen: See prenatal flowsheet   Urine glucose/protein: See prenatal flowsheet   Pelvic: See prenatal flowsheet   MDM:   Impression: 1. Supervision of high risk pregnancy  2. Tobacco use in pregnancy   Tests done today: 1. none   Topics discussed: 1. Continue with PNV's  2. Prenatal labs reviewed  3. elective IOL after 39 weeks if favorable   Tests scheduled today for her next visit:   none

## 2018-08-06 ENCOUNTER — ROUTINE PRENATAL (OUTPATIENT)
Dept: OBSTETRICS AND GYNECOLOGY | Facility: CLINIC | Age: 23
End: 2018-08-06

## 2018-08-06 ENCOUNTER — TELEPHONE (OUTPATIENT)
Dept: OBSTETRICS AND GYNECOLOGY | Facility: CLINIC | Age: 23
End: 2018-08-06

## 2018-08-06 VITALS — WEIGHT: 234 LBS | SYSTOLIC BLOOD PRESSURE: 118 MMHG | DIASTOLIC BLOOD PRESSURE: 82 MMHG | BODY MASS INDEX: 35.58 KG/M2

## 2018-08-06 DIAGNOSIS — Z34.83 PRENATAL CARE, SUBSEQUENT PREGNANCY IN THIRD TRIMESTER: ICD-10-CM

## 2018-08-06 DIAGNOSIS — O99.333 TOBACCO SMOKING AFFECTING PREGNANCY IN THIRD TRIMESTER: ICD-10-CM

## 2018-08-06 PROCEDURE — 99213 OFFICE O/P EST LOW 20 MIN: CPT | Performed by: OBSTETRICS & GYNECOLOGY

## 2018-08-06 NOTE — TELEPHONE ENCOUNTER
Dr Borden    Pt is nearly 38 weeks and back pain and pelvic pressure.  Pt states has been in pain since last night and lost a bit of mucus plug.    Pt call back [phone]

## 2018-08-06 NOTE — PROGRESS NOTES
Chief Complaint   Patient presents with   • Pregnancy Problem       HPI: Alicia is a  currently at 37w5d who today reports the following:  Contractions - YES - but less than 4/hour AND no associated change in vaginal discharge; Leaking - No; Vaginal bleeding -  No; Swelling of extremities - No.    ROS:  GI: Nausea - No; Constipation - No; Diarrhea - No    Neuro: Headache - No; Visual change - No      EXAM:  Vitals: See prenatal flowsheet   Abdomen: See prenatal flowsheet   Urine glucose/protein: See prenatal flowsheet   Pelvic: See prenatal flowsheet   MDM:   Impression: 1. Supervision of high risk pregnancy  2. Tobacco use in pregnancy   Tests done today: 1. none   Topics discussed: 1. Continue with PNV's  2. Prenatal labs reviewed  3. labor signs and symptoms   Tests scheduled today for her next visit:   none

## 2018-08-08 ENCOUNTER — TELEPHONE (OUTPATIENT)
Dept: OBSTETRICS AND GYNECOLOGY | Facility: CLINIC | Age: 23
End: 2018-08-08

## 2018-08-08 NOTE — TELEPHONE ENCOUNTER
Pt called back.  I read Dr. Borden note and told her that a nurse would call her back.  She is itching all over.  Pt has breakout patches around her face, ankles and on the back of her neck.

## 2018-08-08 NOTE — TELEPHONE ENCOUNTER
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 Pt is starting to itch in various parts of her body.    Pt call back [phone]

## 2018-08-08 NOTE — TELEPHONE ENCOUNTER
Spoke with pt reiterated Dr Borden' recommendations concerning Her reported f/m with shingles.  Pt reports sharing beverages with Her father who has shingles.  Advised pt to come to ER or [facility] if rash spreads or she has other c/o's. Pt verbalizes understanding.

## 2018-08-08 NOTE — TELEPHONE ENCOUNTER
She has previously had chickenpox or the chickenpox vaccine there is no worry.  Shingles is not contagious.  If she's never had the vaccine or chickenpox there is some concern about exposure to chickenpox.  So long as she does not develop lesions at this point best thing to do is good handwashing and avoiding exposure

## 2018-08-09 ENCOUNTER — ROUTINE PRENATAL (OUTPATIENT)
Dept: OBSTETRICS AND GYNECOLOGY | Facility: CLINIC | Age: 23
End: 2018-08-09

## 2018-08-09 VITALS — DIASTOLIC BLOOD PRESSURE: 74 MMHG | BODY MASS INDEX: 35.88 KG/M2 | SYSTOLIC BLOOD PRESSURE: 116 MMHG | WEIGHT: 236 LBS

## 2018-08-09 DIAGNOSIS — Z34.83 PRENATAL CARE, SUBSEQUENT PREGNANCY IN THIRD TRIMESTER: ICD-10-CM

## 2018-08-09 DIAGNOSIS — O99.333 TOBACCO SMOKING AFFECTING PREGNANCY IN THIRD TRIMESTER: ICD-10-CM

## 2018-08-09 PROCEDURE — 99213 OFFICE O/P EST LOW 20 MIN: CPT | Performed by: OBSTETRICS & GYNECOLOGY

## 2018-08-09 NOTE — PROGRESS NOTES
Chief Complaint   Patient presents with   • Routine Prenatal Visit       HPI: Alicia is a  currently at 38w1d who today reports the following:  Contractions - No; Leaking - No; Vaginal bleeding -  No; Swelling of extremities - No.    ROS:  GI: Nausea - No; Constipation - No; Diarrhea - No    Neuro: Headache - No; Visual change - No      EXAM:  Vitals: See prenatal flowsheet   Abdomen: See prenatal flowsheet   Urine glucose/protein: See prenatal flowsheet   Pelvic: See prenatal flowsheet   MDM:   Impression: 1. Supervision of high risk pregnancy   Tests done today: 1. none   Topics discussed: 1. Continue with PNV's  2. Prenatal labs reviewed  3. none - she had no major complaints,questions or concerns   Tests scheduled today for her next visit:   none

## 2018-08-12 ENCOUNTER — HOSPITAL ENCOUNTER (OUTPATIENT)
Facility: HOSPITAL | Age: 23
Discharge: HOME | End: 2018-08-12
Attending: OBSTETRICS & GYNECOLOGY | Admitting: OBSTETRICS & GYNECOLOGY

## 2018-08-12 VITALS
RESPIRATION RATE: 16 BRPM | DIASTOLIC BLOOD PRESSURE: 73 MMHG | SYSTOLIC BLOOD PRESSURE: 124 MMHG | HEART RATE: 96 BPM | TEMPERATURE: 98.3 F

## 2018-08-12 PROCEDURE — 59025 FETAL NON-STRESS TEST: CPT | Performed by: OBSTETRICS & GYNECOLOGY

## 2018-08-12 PROCEDURE — 59025 FETAL NON-STRESS TEST: CPT

## 2018-08-12 PROCEDURE — 81003 URINALYSIS AUTO W/O SCOPE: CPT | Performed by: OBSTETRICS & GYNECOLOGY

## 2018-08-12 PROCEDURE — 99213 OFFICE O/P EST LOW 20 MIN: CPT | Performed by: OBSTETRICS & GYNECOLOGY

## 2018-08-12 PROCEDURE — G0463 HOSPITAL OUTPT CLINIC VISIT: HCPCS
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
## 2018-08-14 ENCOUNTER — TELEPHONE (OUTPATIENT)
Dept: OBSTETRICS AND GYNECOLOGY | Facility: CLINIC | Age: 23
End: 2018-08-14

## 2018-08-14 NOTE — TELEPHONE ENCOUNTER
Dr. Borden Pt    Pt is about 39 weeks and is having Sx of vaginal itching which she describes as a yeast infection.  Pt also has Sx of irregular contractions, back pain and pressure.  She has no bleeding or fever.    Callback  [phone]    [facility]

## 2018-08-15 NOTE — TELEPHONE ENCOUNTER
Dr. Borden patient 39w0d patient called with symptoms of a yeast infection.  [phone] left message for patient to return my call.

## 2018-08-16 ENCOUNTER — ROUTINE PRENATAL (OUTPATIENT)
Dept: OBSTETRICS AND GYNECOLOGY | Facility: CLINIC | Age: 23
End: 2018-08-16

## 2018-08-16 VITALS — BODY MASS INDEX: 35.88 KG/M2 | WEIGHT: 236 LBS | DIASTOLIC BLOOD PRESSURE: 74 MMHG | SYSTOLIC BLOOD PRESSURE: 118 MMHG

## 2018-08-16 DIAGNOSIS — O99.333 TOBACCO SMOKING AFFECTING PREGNANCY IN THIRD TRIMESTER: ICD-10-CM

## 2018-08-16 DIAGNOSIS — Z34.83 PRENATAL CARE, SUBSEQUENT PREGNANCY, THIRD TRIMESTER: ICD-10-CM

## 2018-08-16 PROBLEM — O09.623: Status: ACTIVE | Noted: 2018-01-04

## 2018-08-16 PROCEDURE — 99213 OFFICE O/P EST LOW 20 MIN: CPT | Performed by: OBSTETRICS & GYNECOLOGY

## 2018-08-16 NOTE — PROGRESS NOTES
Chief Complaint   Patient presents with   • Routine Prenatal Visit       HPI: Alicia is a  currently at 39w1d who today reports the following:  Contractions - No; Leaking - No; Vaginal bleeding -  No; Swelling of extremities - No.    ROS:  GI: Nausea - No; Constipation - No; Diarrhea - No    Neuro: Headache - No; Visual change - No      EXAM:  Vitals: See prenatal flowsheet   Abdomen: See prenatal flowsheet   Urine glucose/protein: See prenatal flowsheet   Pelvic: See prenatal flowsheet   MDM:   Impression: 1. Supervision of low risk pregnancy   Tests done today: 1. none   Topics discussed: 1. Continue with PNV's  2. Prenatal labs reviewed  3. none - she had no major complaints,questions or concerns   Tests scheduled today for her next visit:   none

## 2018-08-17 ENCOUNTER — TELEPHONE (OUTPATIENT)
Dept: OBSTETRICS AND GYNECOLOGY | Facility: CLINIC | Age: 23
End: 2018-08-17

## 2018-08-17 ENCOUNTER — HOSPITAL ENCOUNTER (OUTPATIENT)
Facility: HOSPITAL | Age: 23
Setting detail: OBSERVATION
Discharge: HOME | End: 2018-08-17
Attending: OBSTETRICS & GYNECOLOGY | Admitting: OBSTETRICS & GYNECOLOGY

## 2018-08-17 VITALS
HEART RATE: 95 BPM | DIASTOLIC BLOOD PRESSURE: 74 MMHG | SYSTOLIC BLOOD PRESSURE: 129 MMHG | TEMPERATURE: 98.3 F | RESPIRATION RATE: 18 BRPM

## 2018-08-17 PROBLEM — Z34.90 PREGNANCY: Status: ACTIVE | Noted: 2018-08-17

## 2018-08-17 PROBLEM — O47.1 FALSE LABOR AFTER 37 WEEKS OF GESTATION WITHOUT DELIVERY: Status: ACTIVE | Noted: 2018-08-17

## 2018-08-17 PROCEDURE — G0378 HOSPITAL OBSERVATION PER HR: HCPCS

## 2018-08-17 PROCEDURE — 99218 PR INITIAL OBSERVATION CARE/DAY 30 MINUTES: CPT | Performed by: OBSTETRICS & GYNECOLOGY

## 2018-08-17 PROCEDURE — 59025 FETAL NON-STRESS TEST: CPT

## 2018-08-17 NOTE — TELEPHONE ENCOUNTER
Dr. Borden patient 39w2d  [phone] patient states she woke up this morning with slight pain in her hips and every time she wipes there is bright red blood on the toilet paper. + fetal movement but baby is not kicking because she does not have room.

## 2018-08-17 NOTE — TELEPHONE ENCOUNTER
sunil    39wk and 2 days ob patient with some light spotting. Says she had an exam yesterday. The blood is light pink in color. Her cramping has been off and on for weeks now.

## 2018-08-17 NOTE — TELEPHONE ENCOUNTER
Dr. Borden patient 39w2d  [phone] called patient back in informed her to go to L&D to be evaluated. Patient states if she can get up her she will.

## 2018-08-18 NOTE — H&P
Alicia Rey  1995  [id number]  [id number]    CC: contractions, spotting  HPI:  Patient is 23 y.o. white female   currently at 39w2d  Presents with c/o uterine contractions.  Ongoing, non-painful, radiates to hip, passed mucus plug which was blood-tinged, otherwise no bleeding or SROM.  Good FM.  PNC comp by tob use, obesity, asthma.      PMH:  Current meds PNV, zoloft 50mg/d, albuterol inhaler prn  Illnesses asthma (allergy-induced), anxiety/depression  Surgeries oral surg, D and C  Allergies Wellbutrin- worse mood swings     Ciprofloxin- GI issues     Bactrim- GI issues     Gabapentin- rash     Naproxen- rash     Depakote- GI problems    Past OB History:       Obstetric History       T2      L2     SAB6   TAB0   Ectopic0   Molar0   Multiple0   Live Births2       # Outcome Date GA Lbr Gerald/2nd Weight Sex Delivery Anes PTL Lv   9 Current            8 Term 03/15/16 41w0d  3175 g (7 lb) M Vag-Spont  N LUISA      Name: Star Reid Mukesh   7 Term 01/06/15 40w6d  2977 g (6 lb 9 oz) F Vag-Spont  N LUISA      Name: Iris   6 SAB            5 SAB            4 SAB            3 SAB            2 SAB            1 SAB               Obstetric Comments    - [facility]   2016 - [place]            SH: tob 1/2PPD , EtOH neg, drugs neg  FH: heart dz pos , diabetes pos , cancer pos    General ROS: N.   All other systems reviewed and are negative.      Physical Examination: General appearance - alert, well appearing, and in no distress  Vital signs - /74   Pulse 95   Temp 98.3 °F (36.8 °C)   Resp 18   LMP 11/15/2017 (Exact Date)   HEENT: normocephalic, atraumatic,oropharynx clear, appearance of ears and nose normal  Neck - supple, no significant adenopathy, no thyromegaly  Lymphatics - no palpable lymphadenopathy in the neck or groin, no hepatosplenomegaly  Chest - clear to auscultation, no wheezes, rales or rhonchi, respiratory effort non-labored  Heart - normal rate, regular rhythm, normal S1, S2, no  murmurs, rubs, clicks or gallops, no JVD, no lower extremity edema  Abdomen - soft, nontender, nondistended, no masses, no hepatosplenomegaly  no rebound tenderness noted, bowel sounds normal  Vaginal Exam: 2/30%/-2, no blood in vault ,external genitalia normal  Extremities - no pedal edema noted, no calf tend, normal gait  Skin -warm and dry, normal coloration and turgor, no rashes, no suspicious skin lesions noted        Fetal monitoring: indication contractions/bleeding , onset 1916 , offset 2019 , baseline 135-140 , mod BTB variability , multiple accels (15 X 15), no decels, irreg contractions, interpretation reactive NST    Radiology     Assessment 1)IUP 39 2/7 weeks    2)false labor    3)tob use     4)asthma     5)obesity    Plan 1)observe      2)home    3)keep next sched appt    Sharif Parks MD  8/17/2018  8:24 PM

## 2018-08-20 ENCOUNTER — HOSPITAL ENCOUNTER (INPATIENT)
Facility: HOSPITAL | Age: 23
LOS: 2 days | Discharge: HOME | End: 2018-08-22
Attending: OBSTETRICS & GYNECOLOGY | Admitting: OBSTETRICS & GYNECOLOGY

## 2018-08-20 ENCOUNTER — ANESTHESIA EVENT (OUTPATIENT)
Dept: LABOR AND DELIVERY | Facility: HOSPITAL | Age: 23
End: 2018-08-20

## 2018-08-20 ENCOUNTER — ANESTHESIA (OUTPATIENT)
Dept: LABOR AND DELIVERY | Facility: HOSPITAL | Age: 23
End: 2018-08-20

## 2018-08-20 DIAGNOSIS — Z34.83 PRENATAL CARE, SUBSEQUENT PREGNANCY, THIRD TRIMESTER: ICD-10-CM

## 2018-08-20 PROBLEM — Z3A.39 39 WEEKS GESTATION OF PREGNANCY: Status: ACTIVE | Noted: 2018-08-20

## 2018-08-20 PROBLEM — O99.333 TOBACCO SMOKING AFFECTING PREGNANCY IN THIRD TRIMESTER: Status: RESOLVED | Noted: 2018-01-19 | Resolved: 2018-08-20

## 2018-08-20 PROBLEM — Z3A.39 39 WEEKS GESTATION OF PREGNANCY: Status: RESOLVED | Noted: 2018-08-20 | Resolved: 2018-08-20

## 2018-08-20 LAB
ABO GROUP BLD: NORMAL
AMPHET+METHAMPHET UR QL: NEGATIVE
AMPHETAMINES UR QL: NEGATIVE
BARBITURATES UR QL SCN: NEGATIVE
BENZODIAZ UR QL SCN: NEGATIVE
BLD GP AB SCN SERPL QL: NEGATIVE
BUPRENORPHINE SERPL-MCNC: NEGATIVE NG/ML
CANNABINOIDS SERPL QL: NEGATIVE
COCAINE UR QL: NEGATIVE
DEPRECATED RDW RBC AUTO: 43.3 FL (ref 37–54)
ERYTHROCYTE [DISTWIDTH] IN BLOOD BY AUTOMATED COUNT: 14.4 % (ref 11.3–14.5)
HCT VFR BLD AUTO: 35.6 % (ref 34.5–44)
HGB BLD-MCNC: 11.5 G/DL (ref 11.5–15.5)
MCH RBC QN AUTO: 26.9 PG (ref 27–31)
MCHC RBC AUTO-ENTMCNC: 32.3 G/DL (ref 32–36)
MCV RBC AUTO: 83.4 FL (ref 80–99)
METHADONE UR QL SCN: NEGATIVE
OPIATES UR QL: NEGATIVE
OXYCODONE UR QL SCN: NEGATIVE
PCP UR QL SCN: NEGATIVE
PLATELET # BLD AUTO: 303 10*3/MM3 (ref 150–450)
PMV BLD AUTO: 10.5 FL (ref 6–12)
PROPOXYPH UR QL: NEGATIVE
RBC # BLD AUTO: 4.27 10*6/MM3 (ref 3.89–5.14)
RH BLD: POSITIVE
T&S EXPIRATION DATE: NORMAL
TRICYCLICS UR QL SCN: NEGATIVE
WBC NRBC COR # BLD: 11.24 10*3/MM3 (ref 3.5–10.8)

## 2018-08-20 PROCEDURE — C1755 CATHETER, INTRASPINAL: HCPCS | Performed by: ANESTHESIOLOGY

## 2018-08-20 PROCEDURE — 25010000002 ROPIVACAINE PER 1 MG: Performed by: ANESTHESIOLOGY

## 2018-08-20 PROCEDURE — 59410 OBSTETRICAL CARE: CPT | Performed by: OBSTETRICS & GYNECOLOGY

## 2018-08-20 PROCEDURE — 86850 RBC ANTIBODY SCREEN: CPT | Performed by: OBSTETRICS & GYNECOLOGY

## 2018-08-20 PROCEDURE — 10907ZC DRAINAGE OF AMNIOTIC FLUID, THERAPEUTIC FROM PRODUCTS OF CONCEPTION, VIA NATURAL OR ARTIFICIAL OPENING: ICD-10-PCS | Performed by: OBSTETRICS & GYNECOLOGY

## 2018-08-20 PROCEDURE — 86901 BLOOD TYPING SEROLOGIC RH(D): CPT | Performed by: OBSTETRICS & GYNECOLOGY

## 2018-08-20 PROCEDURE — 25010000002 FENTANYL CITRATE (PF) 100 MCG/2ML SOLUTION: Performed by: ANESTHESIOLOGY

## 2018-08-20 PROCEDURE — 59025 FETAL NON-STRESS TEST: CPT

## 2018-08-20 PROCEDURE — 86900 BLOOD TYPING SEROLOGIC ABO: CPT | Performed by: OBSTETRICS & GYNECOLOGY

## 2018-08-20 PROCEDURE — C1755 CATHETER, INTRASPINAL: HCPCS

## 2018-08-20 PROCEDURE — 85027 COMPLETE CBC AUTOMATED: CPT | Performed by: OBSTETRICS & GYNECOLOGY

## 2018-08-20 PROCEDURE — P9612 CATHETERIZE FOR URINE SPEC: HCPCS

## 2018-08-20 PROCEDURE — 80306 DRUG TEST PRSMV INSTRMNT: CPT | Performed by: OBSTETRICS & GYNECOLOGY

## 2018-08-20 RX ORDER — LIDOCAINE HYDROCHLORIDE 10 MG/ML
5 INJECTION, SOLUTION EPIDURAL; INFILTRATION; INTRACAUDAL; PERINEURAL AS NEEDED
Status: DISCONTINUED | OUTPATIENT
Start: 2018-08-20 | End: 2018-08-20

## 2018-08-20 RX ORDER — OXYCODONE HYDROCHLORIDE AND ACETAMINOPHEN 5; 325 MG/1; MG/1
2 TABLET ORAL EVERY 4 HOURS PRN
Status: DISCONTINUED | OUTPATIENT
Start: 2018-08-20 | End: 2018-08-22 | Stop reason: HOSPADM

## 2018-08-20 RX ORDER — FAMOTIDINE 10 MG/ML
20 INJECTION, SOLUTION INTRAVENOUS ONCE AS NEEDED
Status: DISCONTINUED | OUTPATIENT
Start: 2018-08-20 | End: 2018-08-20

## 2018-08-20 RX ORDER — LANOLIN 100 %
OINTMENT (GRAM) TOPICAL
Status: DISCONTINUED | OUTPATIENT
Start: 2018-08-20 | End: 2018-08-22 | Stop reason: HOSPADM

## 2018-08-20 RX ORDER — CARBOPROST TROMETHAMINE 250 UG/ML
250 INJECTION, SOLUTION INTRAMUSCULAR AS NEEDED
Status: DISCONTINUED | OUTPATIENT
Start: 2018-08-20 | End: 2018-08-20 | Stop reason: HOSPADM

## 2018-08-20 RX ORDER — OXYTOCIN-SODIUM CHLORIDE 0.9% IV SOLN 30 UNIT/500ML 30-0.9/5 UT/ML-%
85 SOLUTION INTRAVENOUS ONCE
Status: COMPLETED | OUTPATIENT
Start: 2018-08-20 | End: 2018-08-20

## 2018-08-20 RX ORDER — ZOLPIDEM TARTRATE 5 MG/1
5 TABLET ORAL NIGHTLY PRN
Status: DISCONTINUED | OUTPATIENT
Start: 2018-08-20 | End: 2018-08-22 | Stop reason: HOSPADM

## 2018-08-20 RX ORDER — ROPIVACAINE HYDROCHLORIDE 2 MG/ML
15 INJECTION, SOLUTION EPIDURAL; INFILTRATION; PERINEURAL CONTINUOUS
Status: DISCONTINUED | OUTPATIENT
Start: 2018-08-20 | End: 2018-08-20

## 2018-08-20 RX ORDER — MORPHINE SULFATE 2 MG/ML
2 INJECTION, SOLUTION INTRAMUSCULAR; INTRAVENOUS
Status: DISCONTINUED | OUTPATIENT
Start: 2018-08-20 | End: 2018-08-20 | Stop reason: HOSPADM

## 2018-08-20 RX ORDER — NICOTINE 21 MG/24HR
1 PATCH, TRANSDERMAL 24 HOURS TRANSDERMAL
Status: DISCONTINUED | OUTPATIENT
Start: 2018-08-20 | End: 2018-08-22 | Stop reason: HOSPADM

## 2018-08-20 RX ORDER — PROMETHAZINE HYDROCHLORIDE 12.5 MG/1
12.5 TABLET ORAL EVERY 6 HOURS PRN
Status: DISCONTINUED | OUTPATIENT
Start: 2018-08-20 | End: 2018-08-20 | Stop reason: HOSPADM

## 2018-08-20 RX ORDER — ONDANSETRON 2 MG/ML
4 INJECTION INTRAMUSCULAR; INTRAVENOUS EVERY 6 HOURS PRN
Status: DISCONTINUED | OUTPATIENT
Start: 2018-08-20 | End: 2018-08-20

## 2018-08-20 RX ORDER — SODIUM CHLORIDE 0.9 % (FLUSH) 0.9 %
1-10 SYRINGE (ML) INJECTION AS NEEDED
Status: DISCONTINUED | OUTPATIENT
Start: 2018-08-20 | End: 2018-08-20

## 2018-08-20 RX ORDER — OXYCODONE HYDROCHLORIDE AND ACETAMINOPHEN 5; 325 MG/1; MG/1
1 TABLET ORAL EVERY 4 HOURS PRN
Status: DISCONTINUED | OUTPATIENT
Start: 2018-08-20 | End: 2018-08-22 | Stop reason: HOSPADM

## 2018-08-20 RX ORDER — SODIUM CHLORIDE, SODIUM LACTATE, POTASSIUM CHLORIDE, CALCIUM CHLORIDE 600; 310; 30; 20 MG/100ML; MG/100ML; MG/100ML; MG/100ML
125 INJECTION, SOLUTION INTRAVENOUS CONTINUOUS
Status: DISCONTINUED | OUTPATIENT
Start: 2018-08-20 | End: 2018-08-20

## 2018-08-20 RX ORDER — MAGNESIUM CARB/ALUMINUM HYDROX 105-160MG
30 TABLET,CHEWABLE ORAL ONCE
Status: DISCONTINUED | OUTPATIENT
Start: 2018-08-20 | End: 2018-08-20

## 2018-08-20 RX ORDER — IBUPROFEN 600 MG/1
600 TABLET ORAL EVERY 6 HOURS PRN
Status: DISCONTINUED | OUTPATIENT
Start: 2018-08-20 | End: 2018-08-20 | Stop reason: SDUPTHER

## 2018-08-20 RX ORDER — OXYCODONE HYDROCHLORIDE AND ACETAMINOPHEN 5; 325 MG/1; MG/1
1 TABLET ORAL EVERY 4 HOURS PRN
Status: DISCONTINUED | OUTPATIENT
Start: 2018-08-20 | End: 2018-08-20 | Stop reason: SDUPTHER

## 2018-08-20 RX ORDER — OMEPRAZOLE 40 MG/1
40 CAPSULE, DELAYED RELEASE ORAL DAILY
COMMUNITY
End: 2018-10-12

## 2018-08-20 RX ORDER — PROMETHAZINE HYDROCHLORIDE 25 MG/ML
12.5 INJECTION, SOLUTION INTRAMUSCULAR; INTRAVENOUS EVERY 6 HOURS PRN
Status: DISCONTINUED | OUTPATIENT
Start: 2018-08-20 | End: 2018-08-20 | Stop reason: HOSPADM

## 2018-08-20 RX ORDER — BISACODYL 10 MG
10 SUPPOSITORY, RECTAL RECTAL DAILY PRN
Status: DISCONTINUED | OUTPATIENT
Start: 2018-08-21 | End: 2018-08-22 | Stop reason: HOSPADM

## 2018-08-20 RX ORDER — METHYLERGONOVINE MALEATE 0.2 MG/ML
200 INJECTION INTRAVENOUS ONCE AS NEEDED
Status: DISCONTINUED | OUTPATIENT
Start: 2018-08-20 | End: 2018-08-20 | Stop reason: HOSPADM

## 2018-08-20 RX ORDER — DOCUSATE SODIUM 100 MG/1
100 CAPSULE, LIQUID FILLED ORAL 2 TIMES DAILY PRN
Status: DISCONTINUED | OUTPATIENT
Start: 2018-08-20 | End: 2018-08-22 | Stop reason: HOSPADM

## 2018-08-20 RX ORDER — EPHEDRINE SULFATE/0.9% NACL/PF 50 MG/10ML
5 SYRINGE (ML) INTRAVENOUS
Status: DISCONTINUED | OUTPATIENT
Start: 2018-08-20 | End: 2018-08-20

## 2018-08-20 RX ORDER — IBUPROFEN 600 MG/1
600 TABLET ORAL EVERY 6 HOURS PRN
Status: DISCONTINUED | OUTPATIENT
Start: 2018-08-20 | End: 2018-08-22 | Stop reason: HOSPADM

## 2018-08-20 RX ORDER — MISOPROSTOL 200 UG/1
800 TABLET ORAL AS NEEDED
Status: DISCONTINUED | OUTPATIENT
Start: 2018-08-20 | End: 2018-08-20 | Stop reason: HOSPADM

## 2018-08-20 RX ORDER — OXYCODONE HYDROCHLORIDE AND ACETAMINOPHEN 5; 325 MG/1; MG/1
2 TABLET ORAL EVERY 4 HOURS PRN
Status: DISCONTINUED | OUTPATIENT
Start: 2018-08-20 | End: 2018-08-20 | Stop reason: SDUPTHER

## 2018-08-20 RX ORDER — TRISODIUM CITRATE DIHYDRATE AND CITRIC ACID MONOHYDRATE 500; 334 MG/5ML; MG/5ML
30 SOLUTION ORAL ONCE
Status: DISCONTINUED | OUTPATIENT
Start: 2018-08-20 | End: 2018-08-20

## 2018-08-20 RX ORDER — FENTANYL CITRATE 50 UG/ML
INJECTION, SOLUTION INTRAMUSCULAR; INTRAVENOUS AS NEEDED
Status: DISCONTINUED | OUTPATIENT
Start: 2018-08-20 | End: 2018-08-20 | Stop reason: SURG

## 2018-08-20 RX ORDER — PROMETHAZINE HYDROCHLORIDE 12.5 MG/1
12.5 SUPPOSITORY RECTAL EVERY 6 HOURS PRN
Status: DISCONTINUED | OUTPATIENT
Start: 2018-08-20 | End: 2018-08-20 | Stop reason: HOSPADM

## 2018-08-20 RX ORDER — DIPHENHYDRAMINE HYDROCHLORIDE 50 MG/ML
12.5 INJECTION INTRAMUSCULAR; INTRAVENOUS EVERY 8 HOURS PRN
Status: DISCONTINUED | OUTPATIENT
Start: 2018-08-20 | End: 2018-08-20

## 2018-08-20 RX ORDER — OXYTOCIN-SODIUM CHLORIDE 0.9% IV SOLN 30 UNIT/500ML 30-0.9/5 UT/ML-%
650 SOLUTION INTRAVENOUS ONCE
Status: COMPLETED | OUTPATIENT
Start: 2018-08-20 | End: 2018-08-20

## 2018-08-20 RX ORDER — ROPIVACAINE HYDROCHLORIDE 5 MG/ML
INJECTION, SOLUTION EPIDURAL; INFILTRATION; PERINEURAL AS NEEDED
Status: DISCONTINUED | OUTPATIENT
Start: 2018-08-20 | End: 2018-08-20 | Stop reason: SURG

## 2018-08-20 RX ORDER — METOCLOPRAMIDE HYDROCHLORIDE 5 MG/ML
10 INJECTION INTRAMUSCULAR; INTRAVENOUS ONCE AS NEEDED
Status: DISCONTINUED | OUTPATIENT
Start: 2018-08-20 | End: 2018-08-20

## 2018-08-20 RX ADMIN — NICOTINE 1 PATCH: 14 PATCH TRANSDERMAL at 20:48

## 2018-08-20 RX ADMIN — Medication: at 17:28

## 2018-08-20 RX ADMIN — OXYTOCIN-SODIUM CHLORIDE 0.9% IV SOLN 30 UNIT/500ML 85 ML/HR: 30-0.9/5 SOLUTION at 16:05

## 2018-08-20 RX ADMIN — SODIUM CHLORIDE, POTASSIUM CHLORIDE, SODIUM LACTATE AND CALCIUM CHLORIDE 1000 ML: 600; 310; 30; 20 INJECTION, SOLUTION INTRAVENOUS at 13:00

## 2018-08-20 RX ADMIN — ROPIVACAINE HYDROCHLORIDE 10 ML: 5 INJECTION, SOLUTION EPIDURAL; INFILTRATION; PERINEURAL at 13:26

## 2018-08-20 RX ADMIN — NICOTINE 1 PATCH: 14 PATCH TRANSDERMAL at 17:25

## 2018-08-20 RX ADMIN — OXYTOCIN-SODIUM CHLORIDE 0.9% IV SOLN 30 UNIT/500ML 650 ML/HR: 30-0.9/5 SOLUTION at 15:35

## 2018-08-20 RX ADMIN — ROPIVACAINE HYDROCHLORIDE 16 ML/HR: 2 INJECTION, SOLUTION EPIDURAL; INFILTRATION at 13:26

## 2018-08-20 RX ADMIN — WITCH HAZEL 1 PAD: 500 SOLUTION RECTAL; TOPICAL at 17:28

## 2018-08-20 RX ADMIN — DOCUSATE SODIUM 100 MG: 100 CAPSULE, LIQUID FILLED ORAL at 20:37

## 2018-08-20 RX ADMIN — FENTANYL CITRATE 100 MCG: 50 INJECTION, SOLUTION INTRAMUSCULAR; INTRAVENOUS at 13:26

## 2018-08-20 RX ADMIN — SODIUM CHLORIDE, POTASSIUM CHLORIDE, SODIUM LACTATE AND CALCIUM CHLORIDE 125 ML/HR: 600; 310; 30; 20 INJECTION, SOLUTION INTRAVENOUS at 13:20

## 2018-08-20 NOTE — PLAN OF CARE
Problem: Patient Care Overview  Goal: Plan of Care Review  Outcome: Ongoing (interventions implemented as appropriate)   18   Coping/Psychosocial   Plan of Care Reviewed With patient;significant other   Plan of Care Review   Progress improving   OTHER   Outcome Summary s/p  viable infant female     Goal: Individualization and Mutuality  Outcome: Ongoing (interventions implemented as appropriate)   18   Individualization   Patient Specific Preferences Breastfeeding, no Hep B vaccine, no paci     Goal: Discharge Needs Assessment  Outcome: Ongoing (interventions implemented as appropriate)   18   Discharge Needs Assessment   Concerns to be Addressed denies needs/concerns at this time     Goal: Interprofessional Rounds/Family Conf  Outcome: Ongoing (interventions implemented as appropriate)   18   Interdisciplinary Rounds/Family Conf   Participants social work/services  (has open cps case)       Problem: Labor (Cervical Ripen, Induct, Augment) (Adult,Obstetrics,Pediatric)  Goal: Signs and Symptoms of Listed Potential Problems Will be Absent, Minimized or Managed (Labor)  Outcome: Outcome(s) achieved Date Met: 18   Goal/Outcome Evaluation   Problems Assessed (Labor) all   Problems Present (Labor) none

## 2018-08-20 NOTE — LACTATION NOTE
08/20/18 1900   Maternal Information   Person Making Referral other (see comments)  (Courtesy visit, new patient.)   Maternal Reason for Referral other (see comments)  (States baby nursed well after delivery.Nursed other children)

## 2018-08-20 NOTE — PLAN OF CARE
Problem: Patient Care Overview  Goal: Plan of Care Review  Outcome: Ongoing (interventions implemented as appropriate)   08/20/18 1910   Coping/Psychosocial   Plan of Care Reviewed With patient;significant other;family  (Lots of visitors in room.)   Plan of Care Review   Progress (Gave Rx for breast pump.)   OTHER   Outcome Summary States baby nursed well in delivery. States she nursed her other 2 children. Reminded to feed on demand and no longer than 3 hours.

## 2018-08-20 NOTE — ANESTHESIA PROCEDURE NOTES
Labor Epidural    Patient location during procedure: OB  Start Time: 8/20/2018 1:16 PM  Performed By  Anesthesiologist: MARY SMITH  Preanesthetic Checklist  Completed: patient identified, site marked, surgical consent, pre-op evaluation, timeout performed, risks and benefits discussed and monitors and equipment checked  Prep:  Pt Position:sitting  Sterile Tech:cap, gloves, mask and sterile barrier  Prep:alcohol swabs  Monitoring:blood pressure monitoring  Epidural Block Procedure:  Approach:midline  Guidance:landmark technique  Location:L3-L4  Needle Type:Tuohy  Needle Gauge:17 G  Loss of Resistance Medium: air  Loss of Resistance: 7cm  Cath Depth at skin:13 cm  Paresthesia: none  Aspiration:negative  Test Dose:negative  Number of Attempts: 1  Post Assessment:  Dressing:occlusive dressing applied and secured with tape  Pt Tolerance:patient tolerated the procedure well with no apparent complications  Complications:no

## 2018-08-20 NOTE — L&D DELIVERY NOTE
Psychiatric  Vaginal Delivery Note    Delivery details     Delivery: Vaginal, Spontaneous Delivery     YOB: 2018    Time of Birth: 3:26 PM      Anesthesia: Epidural     Delivering clinician: Prosper Borden    Forceps?   No   Vacuum? No    Shoulder dystocia present: No      Delivery narrative:  Uncomplicated delivery from occiput anterior over intact perineum.  Mouth and nose bulb suction on perineum and again after delivery.  Delayed cord clamping for greater than 30 seconds performed followed by clamping the cord.  Infant placed on maternal abdomen.  Placenta delivered spontaneously and intact with normal three-vessel cord.  No lacerations present.    Infant details    Findings: female  infant     Infant observations: Weight: 3675 g (8 lb 1.6 oz)   Length: 19.5  in   Apgars: 8   @ 1 minute /    9   @ 5 minutes     Placenta, Cord, and Fluid    Placenta delivered  Spontaneous  at         Cord: 3 vessels  present.   Nuchal Cord?  no   Cord blood obtained: Yes      Repair    Episiotomy: None    Lacerations: No   Estimated Blood Loss: Est. Blood Loss (mL): 200 mL (Filed from Delivery Summary) (08/20/18 1298)       Complications  none    Disposition  Mother to Mother Baby/Postpartum  in stable condition currently.  Baby to NBN  in stable condition currently.      Prosper Borden MD  08/20/18  3:47 PM

## 2018-08-20 NOTE — PROGRESS NOTES
Ryan  Alicia Rey  : 1995  MRN: [id number]  CSN: [id number]    Labor progress note    Subjective   She is having no problems.     Objective   Min/max vitals past 24 hours:  Temp  Min: 97.5 °F (36.4 °C)  Max: 97.5 °F (36.4 °C)   BP  Min: 120/79  Max: 126/79   Pulse  Min: 79  Max: 81   Resp  Min: 18  Max: 18        FHT's: reassuring and category 1   Cervix: was checked (by me): 4 cm / 70 % / -1   Contractions: irregular        Assessment   1. IUP at 39w5d  2. Fetal status reassuring     Plan   1. AROM - clear fluid seen    Prosper Borden MD  2018
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
## 2018-08-21 LAB
HCT VFR BLD AUTO: 31.4 % (ref 34.5–44)
HGB BLD-MCNC: 10.1 G/DL (ref 11.5–15.5)

## 2018-08-21 PROCEDURE — 85018 HEMOGLOBIN: CPT | Performed by: OBSTETRICS & GYNECOLOGY

## 2018-08-21 PROCEDURE — 85014 HEMATOCRIT: CPT | Performed by: OBSTETRICS & GYNECOLOGY

## 2018-08-21 RX ADMIN — NICOTINE 1 PATCH: 14 PATCH TRANSDERMAL at 20:27

## 2018-08-21 RX ADMIN — IBUPROFEN 600 MG: 600 TABLET, FILM COATED ORAL at 16:03

## 2018-08-21 RX ADMIN — DOCUSATE SODIUM 100 MG: 100 CAPSULE, LIQUID FILLED ORAL at 20:27

## 2018-08-21 RX ADMIN — IBUPROFEN 600 MG: 600 TABLET, FILM COATED ORAL at 04:16

## 2018-08-21 RX ADMIN — IBUPROFEN 600 MG: 600 TABLET, FILM COATED ORAL at 22:28

## 2018-08-21 NOTE — ANESTHESIA POSTPROCEDURE EVALUATION
Patient: Alicia Rey    Procedure Summary     Date:  08/20/18 Room / Location:      Anesthesia Start:  1316 Anesthesia Stop:  1526    Procedure:  LABOR ANALGESIA Diagnosis:      Scheduled Providers:   Provider:  Nicolás Hall MD    Anesthesia Type:  epidural ASA Status:  2 - Emergent          Anesthesia Type: epidural  Last vitals  BP   118/65 (08/21/18 0700)   Temp   97.6 °F (36.4 °C) (08/21/18 0700)   Pulse   62 (08/21/18 0700)   Resp   16 (08/21/18 0700)     SpO2         Post Anesthesia Care and Evaluation    Patient location during evaluation: bedside  Patient participation: complete - patient participated  Level of consciousness: awake and alert  Pain management: adequate  Airway patency: patent  Anesthetic complications: No anesthetic complications    Cardiovascular status: acceptable  Respiratory status: acceptable  Hydration status: acceptable  Post Neuraxial Block status: Motor and sensory function returned to baseline and No signs or symptoms of PDPH

## 2018-08-21 NOTE — PROGRESS NOTES
Ryan  Alicia Rey  : 1995  MRN: [id number]  CSN: [id number]    Postpartum Day #1  Subjective   Her pain is well controlled.  Vaginal bleeding is less than a normal period.       Objective     Min/max vitals past 24 hours:   Temp  Min: 97.5 °F (36.4 °C)  Max: 98.8 °F (37.1 °C)  BP  Min: 87/60  Max: 141/100  Pulse  Min: 62  Max: 97  Resp  Min: 16  Max: 18        Abdomen: soft, non-tender; bowel sounds normal; no masses   fundus firm and non-tender   Pelvic: deferred       Results from last 7 days  Lab Units 18  0752 18  0859   WBC 10*3/mm3  --  11.24*   HEMOGLOBIN g/dL 10.1* 11.5   HEMATOCRIT % 31.4* 35.6   PLATELETS 10*3/mm3  --  303     Lab Results   Component Value Date    ABORH AB Rh Positive 03/15/2016    RH Positive 2018    HEPBSAG Non-Reactive 2018        Assessment   1. Postpartum Day #1 S/P vaginal delivery  2. Doing well     Plan   1. Continue routine postpartum care    Prosper Borden MD  2018  9:25 AM

## 2018-08-22 VITALS
HEART RATE: 58 BPM | RESPIRATION RATE: 16 BRPM | BODY MASS INDEX: 36.22 KG/M2 | TEMPERATURE: 98 F | WEIGHT: 239 LBS | HEIGHT: 68 IN | SYSTOLIC BLOOD PRESSURE: 98 MMHG | DIASTOLIC BLOOD PRESSURE: 47 MMHG

## 2018-08-22 RX ADMIN — IBUPROFEN 600 MG: 600 TABLET, FILM COATED ORAL at 05:18

## 2018-08-22 NOTE — PLAN OF CARE
Problem: Patient Care Overview  Goal: Plan of Care Review  Outcome: Outcome(s) achieved Date Met: 08/22/18    Goal: Individualization and Mutuality  Outcome: Outcome(s) achieved Date Met: 08/22/18    Goal: Discharge Needs Assessment  Outcome: Outcome(s) achieved Date Met: 08/22/18    Goal: Interprofessional Rounds/Family Conf  Outcome: Outcome(s) achieved Date Met: 08/22/18      Problem: Postpartum (Vaginal Delivery) (Adult,Obstetrics,Pediatric)  Goal: Signs and Symptoms of Listed Potential Problems Will be Absent, Minimized or Managed (Postpartum)  Outcome: Outcome(s) achieved Date Met: 08/22/18

## 2018-08-22 NOTE — PROGRESS NOTES
Ryan Rey  : 1995  MRN: [id number]  CSN: [id number]    Postpartum Day #2  Subjective   Her pain is well controlled.  Vaginal bleeding is less than a normal period.      Objective     Min/max vitals past 24 hours:   Temp  Min: 98.1 °F (36.7 °C)  Max: 98.6 °F (37 °C)  BP  Min: 122/63  Max: 142/63  Pulse  Min: 76  Max: 80  Resp  Min: 16  Max: 16        General: well developed; well nourished  no acute distress   Abdomen: fundus firm and non-tender   Pelvic: Not performed   Ext: Calves NT       Results from last 7 days  Lab Units 18  0752 18  0859   WBC 10*3/mm3  --  11.24*   HEMOGLOBIN g/dL 10.1* 11.5   HEMATOCRIT % 31.4* 35.6   PLATELETS 10*3/mm3  --  303     Lab Results   Component Value Date    ABORH AB Rh Positive 03/15/2016    RH Positive 2018    HEPBSAG Non-Reactive 2018        Assessment   1. Postpartum Day #2 S/P vaginal delivery  2. Doing well     Plan   1. Discharge to home  2. Advised no tampons or intercourse for 6 weeks.  3. D/C questions all answered  4. Follow-up appointment in 6 week(s)    Prosper Borden MD  2018  7:09 AM

## 2018-08-22 NOTE — PAYOR COMM NOTE
"Ines Rey (23 y.o. Female)   Discharged 2018 with baby.  Baby's name: Raina Dowlings        Date of Birth Social Security Number Address Home Phone MRN    1995  [address]  [facility] [address] [phone] [id number]    Yarsani Marital Status          None Single       Admission Date Admission Type Admitting Provider Attending Provider Department, Room/Bed    18 Elective Prosper Borden MD  [facility] MOTHER BABY 4A, N403/1    Discharge Date Discharge Disposition Discharge Destination        2018 Home or Self Care              Attending Provider:  (none)   Allergies:  Bactrim [Sulfamethoxazole-trimethoprim], Ciprofloxacin Hcl, Naproxen, Depakote [Divalproex Sodium], Gabapentin, Latex, Tomato, Wellbutrin [Bupropion]    Isolation:  None   Infection:  None   Code Status:  CPR    Ht:  172.7 cm (68\")   Wt:  108 kg (239 lb)    Admission Cmt:  None   Principal Problem:  Postpartum care following  18 (girl) [Z39.2]                 Active Insurance as of 2018     Primary Coverage     Payor Plan Insurance Group Employer/Plan Group    [employer] [place] [employer] [place]ICA[place]      Payor Plan Address Payor Plan Phone Number Effective From Effective To    [address] [address] [phone] 2016     [facility] [id number]       Subscriber Name Subscriber Birth Date Member ID       INES REY 1995 [id number]                 Emergency Contacts      (Rel.) Home Phone Work Phone Mobile Phone    Mai Layne (Other) [phone] -- --                 Discharge Summary      Prosper Borden MD at 2018  7:10 AM          Discharge Summary     Ryan Rey  : 1995  MRN: [id number]  CSN: [id number]    Date of Admission: 2018   Date of Discharge:  2018   Delivering Physician: Prosper Borden        Admission Diagnosis: 1. 39 weeks gestation of pregnancy [Z3A.39]   Discharge Diagnosis: " 1. Same as above plus  2. Pregnancy at 39w5d - delivered       Procedures: 2018  - Vaginal, Spontaneous Delivery       Hospital Course  Patient is a 23 y.o.  who at 39w5d had a vaginal birth.  Her postpartum course was without complications.  On PPD #2 she was ready for discharge.  She had normal lochia and pain well controlled with oral medications.    Infant  female  fetus weighing 3675 g (8 lb 1.6 oz)   Apgars -  8  @ 1 minute /  9  @ 5 minutes.    Discharge labs  Lab Results   Component Value Date    WBC 11.24 (H) 2018    HGB 10.1 (L) 2018    HCT 31.4 (L) 2018     2018       Discharge Medications     Discharge Medications      Continue These Medications      Instructions Start Date   albuterol 108 (90 Base) MCG/ACT inhaler  Commonly known as:  PROVENTIL HFA;VENTOLIN HFA   2 puffs, Inhalation, Every 4 Hours PRN      omeprazole 40 MG capsule  Commonly known as:  priLOSEC   40 mg, Oral, Daily      prenatal (CLASSIC) vitamin 28-0.8 MG tablet tablet   Oral, Daily      sertraline 50 MG tablet  Commonly known as:  ZOLOFT   50 mg, Oral, Daily             Discharge Disposition Home or Self Care   Condition on Discharge: good   Follow-up: 6 weeks with Dr. Michi CARBALLO. MD Michi  2018    Electronically signed by Gabriel Borden MD at 2018  7:10 AM       Discharge Order     Start     Ordered    18 0711  Discharge patient  Once     Expected Discharge Date:  18    Discharge Disposition:  Home or Self Care    Physician of Record for Attribution - Please select from Treatment Team:  GABRIEL BORDEN [[id number]]    Review needed by CMO to determine Physician of Record:  No       Question Answer Comment   Physician of Record for Attribution - Please select from Treatment Team GABRIEL BORDEN    Review needed by CMO to determine Physician of Record No        18 0736

## 2018-08-22 NOTE — DISCHARGE SUMMARY
Discharge Summary     Ryan Rey  : 1995  MRN: [id number]  CSN: [id number]    Date of Admission: 2018   Date of Discharge:  2018   Delivering Physician: Prosper Borden        Admission Diagnosis: 1. 39 weeks gestation of pregnancy [Z3A.39]   Discharge Diagnosis: 1. Same as above plus  2. Pregnancy at 39w5d - delivered       Procedures: 2018  - Vaginal, Spontaneous Delivery       Hospital Course  Patient is a 23 y.o.  who at 39w5d had a vaginal birth.  Her postpartum course was without complications.  On PPD #2 she was ready for discharge.  She had normal lochia and pain well controlled with oral medications.    Infant  female  fetus weighing 3675 g (8 lb 1.6 oz)   Apgars -  8  @ 1 minute /  9  @ 5 minutes.    Discharge labs  Lab Results   Component Value Date    WBC 11.24 (H) 2018    HGB 10.1 (L) 2018    HCT 31.4 (L) 2018     2018       Discharge Medications     Discharge Medications      Continue These Medications      Instructions Start Date   albuterol 108 (90 Base) MCG/ACT inhaler  Commonly known as:  PROVENTIL HFA;VENTOLIN HFA   2 puffs, Inhalation, Every 4 Hours PRN      omeprazole 40 MG capsule  Commonly known as:  priLOSEC   40 mg, Oral, Daily      prenatal (CLASSIC) vitamin 28-0.8 MG tablet tablet   Oral, Daily      sertraline 50 MG tablet  Commonly known as:  ZOLOFT   50 mg, Oral, Daily             Discharge Disposition Home or Self Care   Condition on Discharge: good   Follow-up: 6 weeks with Dr. Michi Borden MD  2018

## 2018-08-23 ENCOUNTER — TELEPHONE (OUTPATIENT)
Dept: OBSTETRICS AND GYNECOLOGY | Facility: CLINIC | Age: 23
End: 2018-08-23

## 2018-08-23 DIAGNOSIS — Z78.9 BREASTFEEDING (INFANT): Primary | ICD-10-CM

## 2018-08-23 RX ORDER — BREAST PUMP
1 EACH MISCELLANEOUS
Qty: 1 EACH | Refills: 0 | Status: SHIPPED | OUTPATIENT
Start: 2018-08-23 | End: 2018-10-12

## 2018-08-23 NOTE — TELEPHONE ENCOUNTER
Dr Borden    Pt calling to see if can get a prescription for a lactate consultant as well as a prescription for a breast pump  [facility].    Pt call back [phone]    [facility] Fax [phone]

## 2018-08-23 NOTE — TELEPHONE ENCOUNTER
Spoke with pt to advise her orders for breast pump as well as lactation consult were faxed to [facility]. Pt verbalizes undersatanding

## 2018-08-31 ENCOUNTER — TELEPHONE (OUTPATIENT)
Dept: OBSTETRICS AND GYNECOLOGY | Facility: CLINIC | Age: 23
End: 2018-08-31

## 2018-08-31 RX ORDER — DOCUSATE SODIUM 100 MG/1
100 CAPSULE, LIQUID FILLED ORAL 2 TIMES DAILY PRN
Qty: 60 CAPSULE | Refills: 1 | Status: SHIPPED | OUTPATIENT
Start: 2018-08-31 | End: 2018-10-12

## 2018-10-12 ENCOUNTER — OFFICE VISIT (OUTPATIENT)
Dept: OBSTETRICS AND GYNECOLOGY | Facility: CLINIC | Age: 23
End: 2018-10-12

## 2018-10-12 VITALS
RESPIRATION RATE: 14 BRPM | BODY MASS INDEX: 33.15 KG/M2 | SYSTOLIC BLOOD PRESSURE: 106 MMHG | WEIGHT: 218 LBS | DIASTOLIC BLOOD PRESSURE: 68 MMHG

## 2018-10-12 DIAGNOSIS — Z11.3 SCREEN FOR STD (SEXUALLY TRANSMITTED DISEASE): ICD-10-CM

## 2018-10-12 PROCEDURE — 0503F POSTPARTUM CARE VISIT: CPT | Performed by: OBSTETRICS & GYNECOLOGY

## 2018-10-12 RX ORDER — IBUPROFEN 600 MG/1
TABLET ORAL
Refills: 0 | COMMUNITY
Start: 2018-10-06 | End: 2020-07-07

## 2018-10-12 RX ORDER — ACETAMINOPHEN AND CODEINE PHOSPHATE 120; 12 MG/5ML; MG/5ML
1 SOLUTION ORAL DAILY
Qty: 28 TABLET | Refills: 5 | Status: SHIPPED | OUTPATIENT
Start: 2018-10-12 | End: 2019-03-14 | Stop reason: SDUPTHER

## 2018-10-17 ENCOUNTER — TELEPHONE (OUTPATIENT)
Dept: OBSTETRICS AND GYNECOLOGY | Facility: CLINIC | Age: 23
End: 2018-10-17

## 2018-10-17 NOTE — TELEPHONE ENCOUNTER
----- Message from Prosper Borden MD sent at 10/17/2018  8:41 AM EDT -----  Please call Alicia and let her know her STD test results are normal.

## 2018-11-30 ENCOUNTER — OFFICE VISIT (OUTPATIENT)
Dept: RETAIL CLINIC | Facility: CLINIC | Age: 23
End: 2018-11-30

## 2018-11-30 VITALS
DIASTOLIC BLOOD PRESSURE: 68 MMHG | SYSTOLIC BLOOD PRESSURE: 120 MMHG | HEART RATE: 83 BPM | BODY MASS INDEX: 32.88 KG/M2 | TEMPERATURE: 98.4 F | HEIGHT: 69 IN | WEIGHT: 222 LBS | OXYGEN SATURATION: 98 %

## 2018-11-30 DIAGNOSIS — J02.9 SORE THROAT: Primary | ICD-10-CM

## 2018-11-30 LAB
EXPIRATION DATE: NORMAL
INTERNAL CONTROL: NORMAL
Lab: NORMAL
S PYO AG THROAT QL: NEGATIVE

## 2018-11-30 PROCEDURE — 99213 OFFICE O/P EST LOW 20 MIN: CPT | Performed by: NURSE PRACTITIONER

## 2018-11-30 PROCEDURE — 87880 STREP A ASSAY W/OPTIC: CPT | Performed by: NURSE PRACTITIONER

## 2018-11-30 NOTE — PROGRESS NOTES
"SHAWNEE@  Alicia TREJO Candido is a 23 y.o. female.   No chief complaint on file.     History of Present Illness   Patient presents with sore throat and concern she may have strep throat. She states she has had fever and chills today and last took IBU 2 hours ago. She is unsure how high her fever has been. Her son has been sick with a sore throat in the last week.   The following portions of the patient's history were reviewed and updated as appropriate: allergies, current medications, past family history, past medical history, past social history, past surgical history and problem list.    Current Outpatient Medications:   •  ibuprofen (ADVIL,MOTRIN) 600 MG tablet, TK 1 T PO Q  6 H FOR 5 DAYS, Disp: , Rfl: 0  •  norethindrone (NOR-QD) 0.35 MG tablet, Take 1 tablet by mouth Daily., Disp: 28 tablet, Rfl: 5  •  Prenatal Vit-Fe Fumarate-FA (PRENATAL, CLASSIC, VITAMIN) 28-0.8 MG tablet tablet, Take  by mouth daily., Disp: , Rfl:   •  sertraline (ZOLOFT) 50 MG tablet, Take 1 tablet by mouth Daily., Disp: 30 tablet, Rfl: 5  •  albuterol (PROVENTIL HFA;VENTOLIN HFA) 108 (90 Base) MCG/ACT inhaler, Inhale 2 puffs Every 4 (Four) Hours As Needed for Wheezing., Disp: 8 g, Rfl: 1    Allergies   Allergen Reactions   • Bactrim [Sulfamethoxazole-Trimethoprim] Nausea And Vomiting   • Ciprofloxacin Hcl Nausea And Vomiting and GI Intolerance   • Naproxen Hives   • Depakote [Divalproex Sodium] GI Intolerance   • Gabapentin Rash and Irritability   • Latex Rash   • Tomato Hives   • Wellbutrin [Bupropion] Rash and Irritability     \"anger outbursts\"       Review of Systems   Constitutional: Positive for chills and fever.   HENT: Positive for sore throat. Negative for ear pain, rhinorrhea, sinus pressure and sinus pain.    Eyes: Negative.    Respiratory: Negative.    Cardiovascular: Negative.    Gastrointestinal: Negative.    Musculoskeletal: Negative.    Skin: Negative.    Allergic/Immunologic: Negative.    Neurological: Negative.  " "  Hematological: Negative.    Psychiatric/Behavioral: Negative.        Objective     Visit Vitals  /68   Pulse 83   Temp 98.4 °F (36.9 °C)   Ht 175.3 cm (69\")   Wt 101 kg (222 lb)   SpO2 98%   BMI 32.78 kg/m²         Physical Exam   Constitutional: She is oriented to person, place, and time. Vital signs are normal. She appears well-developed and well-nourished. She is cooperative.  Non-toxic appearance. She does not have a sickly appearance. She does not appear ill. No distress.   HENT:   Head: Normocephalic and atraumatic.   Right Ear: Hearing, tympanic membrane, external ear and ear canal normal.   Left Ear: Hearing, tympanic membrane, external ear and ear canal normal.   Nose: Mucosal edema and rhinorrhea present. Right sinus exhibits no maxillary sinus tenderness and no frontal sinus tenderness. Left sinus exhibits no maxillary sinus tenderness and no frontal sinus tenderness.   Mouth/Throat: Mucous membranes are normal. Posterior oropharyngeal erythema present. No oropharyngeal exudate, posterior oropharyngeal edema or tonsillar abscesses. Tonsils are 0 on the right. Tonsils are 0 on the left. No tonsillar exudate.   Eyes: Conjunctivae are normal.   Neck: Normal range of motion. Neck supple.   Cardiovascular: Normal rate, regular rhythm and normal heart sounds. Exam reveals no gallop and no friction rub.   No murmur heard.  Pulmonary/Chest: Effort normal and breath sounds normal. No respiratory distress. She has no wheezes.   Lymphadenopathy:     She has no cervical adenopathy.   Neurological: She is alert and oriented to person, place, and time.   Skin: Skin is warm and dry.   Psychiatric: She has a normal mood and affect. Her behavior is normal.   Nursing note and vitals reviewed.      Lab Results (last 24 hours)     Procedure Component Value Units Date/Time    POC Rapid Strep A [[id number]]  (Normal) Collected:  11/30/18 1842    Specimen:  Swab Updated:  11/30/18 1843     Rapid Strep A Screen Negative "     Internal Control Passed     Lot Number [id number]     Expiration Date 4/30/2020          Assessment/Plan   Diagnoses and all orders for this visit:    Sore throat  -     POC Rapid Strep A    warm salt water gargles and IBU as directed prn pain/fever.  Follow up with PCP for worsening s/s.    CASSI Gonzalez

## 2018-12-31 ENCOUNTER — TELEPHONE (OUTPATIENT)
Dept: OBSTETRICS AND GYNECOLOGY | Facility: CLINIC | Age: 23
End: 2018-12-31

## 2018-12-31 DIAGNOSIS — Z32.01 POSITIVE PREGNANCY TEST: Primary | ICD-10-CM

## 2019-01-15 NOTE — TELEPHONE ENCOUNTER
Provider Name  Dr Borden    Reason for Call  35 weeks pregnant, having swelling in feet and hands, feeling dizzy, seeing white spots, no headache, please call her     Pharmacy Name  [facility] in [place], [place]    Call Back Number  [phone]   Telephone Encounter by Dipak Ross at 07/14/17 01:57 PM     Author:  Dipak Ross Service:  (none) Author Type:  Certified Medical Assistant     Filed:  07/14/17 01:57 PM Encounter Date:  7/14/2017 Status:  Signed     :  iDpak Ross (Certified Medical Assistant)            Dr. Dior Alcaraz has taken care of this.  Thank you[SB1.1M]      Revision History        User Key Date/Time User Provider Type Action    > SB1.1 07/14/17 01:57 PM Dipak Ross Certified Medical Assistant Sign    M - Manual

## 2019-03-14 ENCOUNTER — OFFICE VISIT (OUTPATIENT)
Dept: OBSTETRICS AND GYNECOLOGY | Facility: CLINIC | Age: 24
End: 2019-03-14

## 2019-03-14 ENCOUNTER — APPOINTMENT (OUTPATIENT)
Dept: LAB | Facility: HOSPITAL | Age: 24
End: 2019-03-14

## 2019-03-14 VITALS
WEIGHT: 245 LBS | RESPIRATION RATE: 14 BRPM | DIASTOLIC BLOOD PRESSURE: 68 MMHG | SYSTOLIC BLOOD PRESSURE: 116 MMHG | BODY MASS INDEX: 36.18 KG/M2

## 2019-03-14 DIAGNOSIS — Z01.419 WELL WOMAN EXAM WITH ROUTINE GYNECOLOGICAL EXAM: Primary | ICD-10-CM

## 2019-03-14 DIAGNOSIS — R30.0 DYSURIA: ICD-10-CM

## 2019-03-14 PROBLEM — Z30.2 REQUEST FOR STERILIZATION: Status: RESOLVED | Noted: 2018-03-12 | Resolved: 2019-03-14

## 2019-03-14 LAB
BACTERIA UR QL AUTO: NORMAL /HPF
BILIRUB UR QL STRIP: NEGATIVE
CLARITY UR: CLEAR
COLOR UR: YELLOW
GLUCOSE UR STRIP-MCNC: NEGATIVE MG/DL
HGB UR QL STRIP.AUTO: NEGATIVE
HYALINE CASTS UR QL AUTO: NORMAL /LPF
KETONES UR QL STRIP: NEGATIVE
LEUKOCYTE ESTERASE UR QL STRIP.AUTO: NEGATIVE
NITRITE UR QL STRIP: NEGATIVE
PH UR STRIP.AUTO: 6 [PH] (ref 5–8)
PROT UR QL STRIP: NEGATIVE
RBC # UR: NORMAL /HPF
REF LAB TEST METHOD: NORMAL
SP GR UR STRIP: 1.02 (ref 1–1.03)
SQUAMOUS #/AREA URNS HPF: NORMAL /HPF
UROBILINOGEN UR QL STRIP: NORMAL
WBC UR QL AUTO: NORMAL /HPF

## 2019-03-14 PROCEDURE — 81001 URINALYSIS AUTO W/SCOPE: CPT | Performed by: OBSTETRICS & GYNECOLOGY

## 2019-03-14 PROCEDURE — 99395 PREV VISIT EST AGE 18-39: CPT | Performed by: OBSTETRICS & GYNECOLOGY

## 2019-03-14 RX ORDER — ACETAMINOPHEN AND CODEINE PHOSPHATE 120; 12 MG/5ML; MG/5ML
1 SOLUTION ORAL DAILY
Qty: 28 TABLET | Refills: 12 | Status: SHIPPED | OUTPATIENT
Start: 2019-03-14 | End: 2019-12-06

## 2019-03-14 NOTE — PROGRESS NOTES
Subjective   Chief Complaint   Patient presents with   • Gynecologic Exam     Alicia Rey is a 23 y.o. year old  presenting to be seen for her annual exam.  Over the last week there is been some low back pain as well as terminal dysuria.    Has decided against getting her tubes tied.    SEXUAL Hx:  She is currently sexually active.  In the past year there there has been NO new sexual partners.    Condoms are used intermittently.  She would not like to be screened for STD's at today's exam.  Current birth control method: OCP (progestin only).  She is happy with her current method of contraception and does not want to discuss alternative methods of contraception.  MENSTRUAL Hx:  No LMP recorded (lmp unknown). Patient is not currently having periods (Reason: Oral contraceptives).  In the past 6 months her cycles have been absent.  Her menstrual flow has been absent.   Each month on average there are roughly 0 day(s) of very heavy flow.    Intermenstrual bleeding is absent.    Post-coital bleeding is absent.  Dysmenorrhea: none  PMS: is not affecting her activities of daily living  Her cycles are not a source of concern for her that she wishes to discuss today.  HEALTH Hx:  She exercises regularly: no (and has no plans to become more active).  She wears her seat belt: yes.  She has concerns about domestic violence: no.  OTHER THINGS SHE WANTS TO DISCUSS TODAY:  Nothing else    The following portions of the patient's history were reviewed and updated as appropriate:problem list, current medications, allergies, past family history, past medical history, past social history and past surgical history.    Social History    Tobacco Use      Smoking status: Former Smoker        Packs/day: 0.50        Years: 7.00        Pack years: 3.5        Start date:         Quit date: 2018        Years since quittin.6      Smokeless tobacco: Never Used    Review of Systems  Constitutional POS: nothing reported     NEG: anorexia or night sweats   Genitourinary POS: see HPI    NEG: hematuria      Gastointestinal POS: nothing reported    NEG: bloating, change in bowel habits, melena or reflux symptoms   Integument POS: nothing reported    NEG: moles that are changing in size, shape, color or rashes   Breast POS: nothing reported    NEG: persistent breast lump, skin dimpling or nipple discharge        Objective   /68   Resp 14   Wt 111 kg (245 lb)   LMP  (LMP Unknown)   Breastfeeding? Unknown   BMI 36.18 kg/m²     General:  well developed; well nourished  no acute distress   Skin:  No suspicious lesions seen   Thyroid: normal to inspection and palpation   Breasts:  Not performed.   Abdomen: soft, non-tender; no masses  no umbilical or inguinal hernias are present  no hepato-splenomegaly   Pelvis: Clinical staff was present for exam  External genitalia:  normal appearance of the external genitalia including Bartholin's and [place]'s glands.  :  urethral meatus normal;  Vaginal:  normal pink mucosa without prolapse or lesions.  Cervix:  normal appearance.  Uterus:  normal size, shape and consistency.  Adnexa:  normal bimanual exam of the adnexa.  Rectal:  digital rectal exam not performed; anus visually normal appearing.        Assessment   1. Normal GYN exam  2. R/o UTI  3. Amenorrhea - most consistent with lactation     Plan   1. Pap was not done today.  I explained to Alicia that the recommendations for Pap smear interval in a low risk patient has lengthened to 3 years time.  I told Alicia she still needs to be seen in our office yearly for a full physical including breast and pelvic exam.  2. The following tests were ordered today: UA with culture if indicated.  It was explained to Alicia that all lab test should be back within the one week after they are performed. She will be notified about the results, regardless of the findings. If she has not been contacted by the office within 2 weeks after the test has been  performed, it is her responsibility to contact us to learn about her results.  3. BC options reviewed including Mirena and Nexplanon  4. Call when weans  5. The importance of keeping all planned follow-up and taking all medications as prescribed was emphasized.  6. Follow up for annual exam 1 year    New Medications Ordered This Visit   Medications   • norethindrone (NOR-QD) 0.35 MG tablet     Sig: Take 1 tablet by mouth Daily.     Dispense:  28 tablet     Refill:  12          This note was electronically signed.    Prosper Borden M.D.  March 14, 2019    Note: Speech recognition transcription software may have been used to create portions of this document.  An attempt at proofreading has been made but errors in transcription could still be present.

## 2019-04-05 ENCOUNTER — TELEPHONE (OUTPATIENT)
Dept: OBSTETRICS AND GYNECOLOGY | Facility: CLINIC | Age: 24
End: 2019-04-05

## 2019-04-05 RX ORDER — LEVONORGESTREL 1.5 MG/1
1.5 TABLET ORAL ONCE
Qty: 1 TABLET | Refills: 0 | Status: SHIPPED | OUTPATIENT
Start: 2019-04-05 | End: 2019-04-05

## 2019-04-05 NOTE — TELEPHONE ENCOUNTER
Would let her know there is not a weight limit where it is absolutely contraindicated.  All birth control does have reduced effectiveness the heavier people are.  Other options such as IUDs are much less dependent on weight for their effectiveness.

## 2019-04-05 NOTE — TELEPHONE ENCOUNTER
Provider Name  Dr Borden  Reason for Call  Patient wants to know if there is a weight limit for effectiveness for Plan B pill  Pharmacy Name  [facility] in [place], [place]  Call Back Number  [phone]

## 2019-04-05 NOTE — TELEPHONE ENCOUNTER
"Pt informed and stated understanding. Asked if it could be sent to her pharmacy due to having an \"oops\" yesterday, pt forgot to take her pill.  "

## 2019-04-30 ENCOUNTER — OFFICE VISIT (OUTPATIENT)
Dept: RETAIL CLINIC | Facility: CLINIC | Age: 24
End: 2019-04-30

## 2019-04-30 VITALS
DIASTOLIC BLOOD PRESSURE: 60 MMHG | TEMPERATURE: 98.6 F | SYSTOLIC BLOOD PRESSURE: 120 MMHG | HEART RATE: 61 BPM | OXYGEN SATURATION: 98 %

## 2019-04-30 DIAGNOSIS — J30.2 SEASONAL ALLERGIES: Primary | ICD-10-CM

## 2019-04-30 PROCEDURE — 99213 OFFICE O/P EST LOW 20 MIN: CPT | Performed by: NURSE PRACTITIONER

## 2019-04-30 RX ORDER — LORATADINE 10 MG/1
10 TABLET ORAL DAILY
Qty: 30 TABLET | Refills: 0 | Status: SHIPPED | OUTPATIENT
Start: 2019-04-30 | End: 2019-12-06

## 2019-04-30 RX ORDER — FLUTICASONE PROPIONATE 50 MCG
2 SPRAY, SUSPENSION (ML) NASAL DAILY
Qty: 1 BOTTLE | Refills: 0 | Status: SHIPPED | OUTPATIENT
Start: 2019-04-30 | End: 2019-12-06

## 2019-04-30 RX ORDER — PRENATAL VIT NO.126/IRON/FOLIC 28MG-0.8MG
TABLET ORAL DAILY
COMMUNITY
End: 2019-04-30 | Stop reason: SDUPTHER

## 2019-04-30 NOTE — PROGRESS NOTES
"SHAWNEE@  Alicia TREJO Candido is a 24 y.o. female.   Chief Complaint   Patient presents with   • Headache   • Dizziness   • Earache     bilateral      History of Present Illness   Several day h/o ear fullness, facial fullness and dizziness. She is not sure what is causing this but she states it is going thru her household. She is currently breast feeding and has not been taking medication.   The following portions of the patient's history were reviewed and updated as appropriate: allergies, current medications, past family history, past medical history, past social history, past surgical history and problem list.    Current Outpatient Medications:   •  ibuprofen (ADVIL,MOTRIN) 600 MG tablet, TK 1 T PO Q  6 H FOR 5 DAYS, Disp: , Rfl: 0  •  norethindrone (NOR-QD) 0.35 MG tablet, Take 1 tablet by mouth Daily., Disp: 28 tablet, Rfl: 12  •  vitamin d ([employer] D3) 5000 units capsule, Take 1 capsule by mouth Daily., Disp: 30 capsule, Rfl: 11  •  fluticasone (FLONASE) 50 MCG/ACT nasal spray, 2 sprays into the nostril(s) as directed by provider Daily., Disp: 1 bottle, Rfl: 0  •  loratadine (CLARITIN) 10 MG tablet, Take 1 tablet by mouth Daily., Disp: 30 tablet, Rfl: 0  •  Prenatal Multivit-Min-Fe-FA (PRENATAL VITAMINS) 0.8 MG tablet, Take 1 tablet by mouth Daily., Disp: 30 tablet, Rfl: 11    Allergies   Allergen Reactions   • Bactrim [Sulfamethoxazole-Trimethoprim] Nausea And Vomiting   • Ciprofloxacin Hcl Nausea And Vomiting and GI Intolerance   • Naproxen Hives   • Depakote [Divalproex Sodium] GI Intolerance   • Gabapentin Rash and Irritability   • Latex Rash   • Tomato Hives   • Wellbutrin [Bupropion] Rash and Irritability     \"anger outbursts\"       Review of Systems   Constitutional: Negative for activity change, appetite change, chills, fatigue and fever.   HENT: Positive for congestion and sinus pressure. Negative for ear discharge, facial swelling, hearing loss, postnasal drip, sneezing, sore throat, tinnitus, " trouble swallowing and voice change.    Eyes: Positive for itching.   Respiratory: Negative.    Cardiovascular: Negative.    Gastrointestinal: Negative.    Musculoskeletal: Negative.    Skin: Negative.    Allergic/Immunologic: Positive for environmental allergies.   Neurological: Positive for dizziness.   Hematological: Negative.    Psychiatric/Behavioral: Negative.        Objective     Visit Vitals  /60   Pulse 61   Temp 98.6 °F (37 °C)   SpO2 98%         Physical Exam   Constitutional: She is oriented to person, place, and time. Vital signs are normal. She appears well-developed and well-nourished. She is cooperative.  Non-toxic appearance. She does not have a sickly appearance. She does not appear ill. No distress.   HENT:   Head: Normocephalic and atraumatic.   Right Ear: Hearing, tympanic membrane, external ear and ear canal normal.   Left Ear: Hearing, tympanic membrane, external ear and ear canal normal.   Nose: Nose normal. No mucosal edema or rhinorrhea. Right sinus exhibits no maxillary sinus tenderness and no frontal sinus tenderness. Left sinus exhibits no maxillary sinus tenderness and no frontal sinus tenderness.   Mouth/Throat: Oropharynx is clear and moist and mucous membranes are normal. No oropharyngeal exudate, posterior oropharyngeal edema, posterior oropharyngeal erythema or tonsillar abscesses. Tonsils are 0 on the right. Tonsils are 0 on the left. No tonsillar exudate.   Eyes: Conjunctivae and EOM are normal. Pupils are equal, round, and reactive to light.   Neck: Normal range of motion. Neck supple.   Cardiovascular: Normal rate, regular rhythm and normal heart sounds.   No murmur heard.  Pulmonary/Chest: Effort normal and breath sounds normal. No respiratory distress. She has no wheezes.   Musculoskeletal: She exhibits no tenderness.   Lymphadenopathy:     She has no cervical adenopathy.   Neurological: She is alert and oriented to person, place, and time.   Skin: Skin is warm and dry.  No rash noted.   Psychiatric: She has a normal mood and affect. Her behavior is normal. Judgment and thought content normal.   Nursing note and vitals reviewed.      Lab Results (last 24 hours)     ** No results found for the last 24 hours. **          Assessment/Plan   Alicia was seen today for headache, dizziness and earache.    Diagnoses and all orders for this visit:    Seasonal allergies  -     fluticasone (FLONASE) 50 MCG/ACT nasal spray; 2 sprays into the nostril(s) as directed by provider Daily.  -     loratadine (CLARITIN) 10 MG tablet; Take 1 tablet by mouth Daily.    Lactating mother  -     Prenatal Multivit-Min-Fe-FA (PRENATAL VITAMINS) 0.8 MG tablet; Take 1 tablet by mouth Daily.      CASSI Gonzalez

## 2019-04-30 NOTE — PATIENT INSTRUCTIONS
Allergies, Adult  An allergy means that your body reacts to something that bothers it (allergen). It is not a normal reaction. This can happen from something that you:  · Eat.  · Breathe in.  · Touch.    You can have an allergy (be allergic) to:  · Outdoor things, like:  ? Pollen.  ? Grass.  ? Weeds.  · Indoor things, like:  ? Dust.  ? Smoke.  ? Pet dander.  · Foods.  · Medicines.  · Things that bother your skin, like:  ? Detergents.  ? Chemicals.  ? Latex.  · Perfume.  · Bugs.    An allergy cannot spread from person to person (is not contagious).  Follow these instructions at home:  · Stay away from things that you know you are allergic to.  · If you have allergies to things in the air, wash out your nose each day. Do it with one of these:  ? A salt-water (saline) spray.  ? A container (neti pot).  · Take over-the-counter and prescription medicines only as told by your doctor.  · Keep all follow-up visits as told by your doctor. This is important.  · If you are at risk for a very bad allergy reaction (anaphylaxis), keep an auto-injector with you all the time. This is called an epinephrine injection.  ? This is pre-measured medicine with a needle. You can put it into your skin by yourself.  ? Right after you have a very bad allergy reaction, you or a person with you must give the medicine in less than a few minutes. This is an emergency.  · If you have ever had a very bad allergy reaction, wear a medical alert bracelet or necklace. Your very bad allergy should be written on it.  Contact a health care provider if:  · Your symptoms do not get better with treatment.  Get help right away if:  · You have symptoms of a very bad allergy reaction. These include:  ? A swollen mouth, tongue, or throat.  ? Pain or tightness in your chest.  ? Trouble breathing.  ? Being short of breath.  ? Dizziness.  ? Fainting.  ? Very bad pain in your belly (abdomen).  ? Throwing up (vomiting).  ? Watery poop (diarrhea).  Summary  · An  allergy means that your body reacts to something that bothers it (allergen). It is not a normal reaction.  · Stay away from things that make your body react.  · Take over-the-counter and prescription medicines only as told by your doctor.  · If you are at risk for a very bad allergy reaction, carry an auto-injector (epinephrine injection) all the time. Also, wear a medical alert bracelet or necklace so people know about your allergy.  This information is not intended to replace advice given to you by your health care provider. Make sure you discuss any questions you have with your health care provider.  Document Released: 04/14/2014 Document Revised: 04/02/2018 Document Reviewed: 04/02/2018  Elsevier Interactive Patient Education © 2019 [employer] Inc[employer]

## 2019-05-01 ENCOUNTER — TELEPHONE (OUTPATIENT)
Dept: OBSTETRICS AND GYNECOLOGY | Facility: CLINIC | Age: 24
End: 2019-05-01

## 2019-05-01 NOTE — TELEPHONE ENCOUNTER
Pt requested [facility] Genetic Testing Results from 2018 on My Chart.    Per Dr Borden, a copy of the report was mailed to the Pt.

## 2019-05-03 ENCOUNTER — OFFICE VISIT (OUTPATIENT)
Dept: RETAIL CLINIC | Facility: CLINIC | Age: 24
End: 2019-05-03

## 2019-05-03 VITALS
BODY MASS INDEX: 36.29 KG/M2 | WEIGHT: 245 LBS | HEART RATE: 78 BPM | OXYGEN SATURATION: 95 % | HEIGHT: 69 IN | SYSTOLIC BLOOD PRESSURE: 118 MMHG | TEMPERATURE: 97.7 F | DIASTOLIC BLOOD PRESSURE: 60 MMHG

## 2019-05-03 DIAGNOSIS — H65.111 ACUTE MUCOID OTITIS MEDIA OF RIGHT EAR: Primary | ICD-10-CM

## 2019-05-03 PROCEDURE — 99213 OFFICE O/P EST LOW 20 MIN: CPT | Performed by: NURSE PRACTITIONER

## 2019-05-03 RX ORDER — AZITHROMYCIN 250 MG/1
TABLET, FILM COATED ORAL
Qty: 6 TABLET | Refills: 0 | Status: SHIPPED | OUTPATIENT
Start: 2019-05-03 | End: 2019-12-06

## 2019-05-03 NOTE — PATIENT INSTRUCTIONS
Otitis Media, Adult  Otitis media means that the middle ear is red and swollen (inflamed) and full of fluid. The condition usually goes away on its own.  Follow these instructions at home:  · Take over-the-counter and prescription medicines only as told by your doctor.  · If you were prescribed an antibiotic medicine, take it as told by your doctor. Do not stop taking the antibiotic even if you start to feel better.  · Keep all follow-up visits as told by your doctor. This is important.  Contact a doctor if:  · You have bleeding from your nose.  · There is a lump on your neck.  · You are not getting better in 5 days.  · You feel worse instead of better.  Get help right away if:  · You have pain that is not helped with medicine.  · You have swelling, redness, or pain around your ear.  · You get a stiff neck.  · You cannot move part of your face (paralyzed).  · You notice that the bone behind your ear hurts when you touch it.  · You get a very bad headache.  Summary  · Otitis media means that the middle ear is red, swollen, and full of fluid.  · This condition usually goes away on its own. In some cases, treatment may be needed.  · If you were prescribed an antibiotic medicine, take it as told by your doctor.  This information is not intended to replace advice given to you by your health care provider. Make sure you discuss any questions you have with your health care provider.  Document Released: 06/05/2009 Document Revised: 01/08/2018 Document Reviewed: 01/08/2018  [employer] Interactive Patient Education © 2019 [employer].

## 2019-05-03 NOTE — PROGRESS NOTES
"SHAWNEE@  Alicia TREJO Candido is a 24 y.o. female.   Chief Complaint   Patient presents with   • Earache     right   • Nasal Congestion   • Sore Throat      History of Present Illness   1 week h/o head congestion with nasal discharge (yellow) and ear fullness. She has developed a sore throat and right ear pain last night and feels as though she is not improving. She is taking antihistamine and flonase nasal sprays without improvement.   The following portions of the patient's history were reviewed and updated as appropriate: allergies, current medications, past family history, past medical history, past social history, past surgical history and problem list.    Current Outpatient Medications:   •  fluticasone (FLONASE) 50 MCG/ACT nasal spray, 2 sprays into the nostril(s) as directed by provider Daily., Disp: 1 bottle, Rfl: 0  •  ibuprofen (ADVIL,MOTRIN) 600 MG tablet, TK 1 T PO Q  6 H FOR 5 DAYS, Disp: , Rfl: 0  •  loratadine (CLARITIN) 10 MG tablet, Take 1 tablet by mouth Daily., Disp: 30 tablet, Rfl: 0  •  norethindrone (NOR-QD) 0.35 MG tablet, Take 1 tablet by mouth Daily., Disp: 28 tablet, Rfl: 12  •  Prenatal Multivit-Min-Fe-FA (PRENATAL VITAMINS) 0.8 MG tablet, Take 1 tablet by mouth Daily., Disp: 30 tablet, Rfl: 11  •  vitamin d (CVS D3) 5000 units capsule, Take 1 capsule by mouth Daily., Disp: 30 capsule, Rfl: 11  •  azithromycin (ZITHROMAX) 250 MG tablet, Take 2 tablets the first day, then 1 tablet daily for 4 days., Disp: 6 tablet, Rfl: 0    Allergies   Allergen Reactions   • Bactrim [Sulfamethoxazole-Trimethoprim] Nausea And Vomiting   • Ciprofloxacin Hcl Nausea And Vomiting and GI Intolerance   • Naproxen Hives   • Depakote [Divalproex Sodium] GI Intolerance   • Gabapentin Rash and Irritability   • Latex Rash   • Tomato Hives   • Wellbutrin [Bupropion] Rash and Irritability     \"anger outbursts\"       Review of Systems   Constitutional: Positive for activity change. Negative for appetite change, " "chills, fatigue and fever.   HENT: Positive for congestion, ear pain (right), rhinorrhea, sinus pressure, sinus pain (right), sore throat and trouble swallowing. Negative for ear discharge and voice change.    Eyes: Negative.    Respiratory: Positive for cough. Negative for shortness of breath and wheezing.    Cardiovascular: Negative.    Musculoskeletal: Negative.    Skin: Negative.    Allergic/Immunologic: Positive for environmental allergies.   Neurological: Positive for headaches.   Hematological: Negative.    Psychiatric/Behavioral: Negative.        Objective     Visit Vitals  /60   Pulse 78   Temp 97.7 °F (36.5 °C)   Ht 175.3 cm (69\")   Wt 111 kg (245 lb)   SpO2 95%   BMI 36.18 kg/m²         Physical Exam   Constitutional: She is oriented to person, place, and time. Vital signs are normal. She appears well-developed and well-nourished. She is cooperative.  Non-toxic appearance. She does not have a sickly appearance. She does not appear ill. No distress.   HENT:   Head: Normocephalic and atraumatic.   Right Ear: Hearing, external ear and ear canal normal. Tympanic membrane is erythematous. Tympanic membrane is not injected. A middle ear effusion is present.   Left Ear: Hearing, tympanic membrane, external ear and ear canal normal.   Nose: Mucosal edema and rhinorrhea present. Right sinus exhibits maxillary sinus tenderness and frontal sinus tenderness.   Mouth/Throat: Mucous membranes are normal. Posterior oropharyngeal erythema present. No oropharyngeal exudate, posterior oropharyngeal edema or tonsillar abscesses. Tonsils are 0 on the right. Tonsils are 0 on the left.   Eyes: EOM are normal. Pupils are equal, round, and reactive to light.   Neck: Normal range of motion. Neck supple.   Cardiovascular: Normal rate, regular rhythm and normal heart sounds.   No murmur heard.  Pulmonary/Chest: Effort normal and breath sounds normal. No respiratory distress. She has no wheezes.   Lymphadenopathy:     She has " no cervical adenopathy.   Neurological: She is alert and oriented to person, place, and time.   Skin: Skin is warm.   Nursing note and vitals reviewed.      Lab Results (last 24 hours)     ** No results found for the last 24 hours. **          Assessment/Plan   Alicia was seen today for earache, nasal congestion and sore throat.    Diagnoses and all orders for this visit:    Acute mucoid otitis media of right ear  -     azithromycin (ZITHROMAX) 250 MG tablet; Take 2 tablets the first day, then 1 tablet daily for 4 days.    continue current plan of care for allergy maintenance.   Follow up prn worsening s/s or no improvement.     Tamica Campos, CASSI

## 2019-05-09 RX ORDER — ACETAMINOPHEN AND CODEINE PHOSPHATE 120; 12 MG/5ML; MG/5ML
1 SOLUTION ORAL DAILY
Qty: 28 TABLET | Refills: 0 | Status: SHIPPED | OUTPATIENT
Start: 2019-05-09 | End: 2019-07-02 | Stop reason: SDUPTHER

## 2019-05-13 ENCOUNTER — TELEPHONE (OUTPATIENT)
Dept: OBSTETRICS AND GYNECOLOGY | Facility: CLINIC | Age: 24
End: 2019-05-13

## 2019-05-13 RX ORDER — FLUCONAZOLE 150 MG/1
150 TABLET ORAL DAILY
Qty: 1 TABLET | Refills: 0 | Status: SHIPPED | OUTPATIENT
Start: 2019-05-13 | End: 2019-12-06

## 2019-05-29 DIAGNOSIS — J30.2 SEASONAL ALLERGIES: ICD-10-CM

## 2019-05-31 RX ORDER — FLUTICASONE PROPIONATE 50 MCG
SPRAY, SUSPENSION (ML) NASAL
Qty: 16 ML | Refills: 0 | OUTPATIENT
Start: 2019-05-31

## 2019-07-02 RX ORDER — ACETAMINOPHEN AND CODEINE PHOSPHATE 120; 12 MG/5ML; MG/5ML
1 SOLUTION ORAL DAILY
Qty: 28 TABLET | Refills: 0 | Status: SHIPPED | OUTPATIENT
Start: 2019-07-02 | End: 2019-08-14 | Stop reason: SDUPTHER

## 2019-08-14 RX ORDER — ACETAMINOPHEN AND CODEINE PHOSPHATE 120; 12 MG/5ML; MG/5ML
1 SOLUTION ORAL DAILY
Qty: 28 TABLET | Refills: 0 | Status: SHIPPED | OUTPATIENT
Start: 2019-08-14 | End: 2020-07-07

## 2019-08-22 RX ORDER — SERTRALINE HYDROCHLORIDE 100 MG/1
100 TABLET, FILM COATED ORAL DAILY
Qty: 30 TABLET | Refills: 5 | Status: SHIPPED | OUTPATIENT
Start: 2019-08-22 | End: 2019-12-06 | Stop reason: SINTOL

## 2019-08-22 NOTE — TELEPHONE ENCOUNTER
You can let her know a prescription has been sent.    New Medications Ordered This Visit   Medications   • sertraline (ZOLOFT) 100 MG tablet     Sig: Take 1 tablet by mouth Daily.     Dispense:  30 tablet     Refill:  5

## 2019-08-22 NOTE — TELEPHONE ENCOUNTER
Pt states that you all had discussed her upping the zoloft to 100mg but it had not been called in that way. She went ahead and began to take 2 of the 50mg tablets a day and stated that it has been working well. Pt also scheduled her annual for 3/18/20.

## 2019-09-24 ENCOUNTER — TELEPHONE (OUTPATIENT)
Dept: OBSTETRICS AND GYNECOLOGY | Facility: CLINIC | Age: 24
End: 2019-09-24

## 2019-09-24 RX ORDER — FLUCONAZOLE 150 MG/1
150 TABLET ORAL DAILY
Qty: 2 TABLET | Refills: 0 | Status: SHIPPED | OUTPATIENT
Start: 2019-09-24 | End: 2019-12-06

## 2019-09-24 NOTE — TELEPHONE ENCOUNTER
sunil    Pt needs diflucan called in. She has a yeast infection that is very angry.     [facility] in [place], [place] on [facility].

## 2019-09-26 ENCOUNTER — TELEPHONE (OUTPATIENT)
Dept: OBSTETRICS AND GYNECOLOGY | Facility: CLINIC | Age: 24
End: 2019-09-26

## 2019-09-26 NOTE — TELEPHONE ENCOUNTER
Pt states that she is now just having some light pink bleeding and that she is having some clots but that it is normal for her. Pt states that the purple clot happened just the once and that she is not having really any cramping or anything. I informed pt to keep an eye on this and that if it happened again or if she began to have bad cramping that she can give us a call back. Pt stated understanding

## 2019-09-26 NOTE — TELEPHONE ENCOUNTER
NIDIA    PT CALLED STATING SHE STARTED HER CYCLE AFTER NOT HAVING ONE FOR A WHILE. SHE SAYS IT'S PURPLE IN COLOR SURROUNDED IN A LIGHT PINK.

## 2019-12-06 ENCOUNTER — OFFICE VISIT (OUTPATIENT)
Dept: INTERNAL MEDICINE | Facility: CLINIC | Age: 24
End: 2019-12-06

## 2019-12-06 VITALS
SYSTOLIC BLOOD PRESSURE: 122 MMHG | OXYGEN SATURATION: 99 % | WEIGHT: 229.8 LBS | DIASTOLIC BLOOD PRESSURE: 60 MMHG | HEIGHT: 67 IN | TEMPERATURE: 97 F | BODY MASS INDEX: 36.07 KG/M2 | HEART RATE: 83 BPM

## 2019-12-06 DIAGNOSIS — M54.41 CHRONIC MIDLINE LOW BACK PAIN WITH RIGHT-SIDED SCIATICA: ICD-10-CM

## 2019-12-06 DIAGNOSIS — G89.29 CHRONIC MIDLINE LOW BACK PAIN WITH RIGHT-SIDED SCIATICA: ICD-10-CM

## 2019-12-06 DIAGNOSIS — R23.3 EASY BRUISING: ICD-10-CM

## 2019-12-06 DIAGNOSIS — R20.2 NUMBNESS AND TINGLING OF RIGHT LEG: ICD-10-CM

## 2019-12-06 DIAGNOSIS — M25.50 ARTHRALGIA, UNSPECIFIED JOINT: ICD-10-CM

## 2019-12-06 DIAGNOSIS — R20.0 NUMBNESS AND TINGLING OF RIGHT LEG: ICD-10-CM

## 2019-12-06 DIAGNOSIS — R07.81 PLEURITIC CHEST PAIN: ICD-10-CM

## 2019-12-06 DIAGNOSIS — R20.0 NUMBNESS OF RIGHT HAND: ICD-10-CM

## 2019-12-06 DIAGNOSIS — R22.31 SUBCUTANEOUS NODULE OF RIGHT FOREARM: Primary | ICD-10-CM

## 2019-12-06 LAB
BASOPHILS # BLD AUTO: 0.01 10*3/MM3 (ref 0–0.2)
BASOPHILS NFR BLD AUTO: 0.2 % (ref 0–1.5)
DEPRECATED RDW RBC AUTO: 38.2 FL (ref 37–54)
EOSINOPHIL # BLD AUTO: 0.07 10*3/MM3 (ref 0–0.4)
EOSINOPHIL NFR BLD AUTO: 1.4 % (ref 0.3–6.2)
ERYTHROCYTE [DISTWIDTH] IN BLOOD BY AUTOMATED COUNT: 12.5 % (ref 12.3–15.4)
ERYTHROCYTE [SEDIMENTATION RATE] IN BLOOD: 13 MM/HR (ref 0–20)
HCT VFR BLD AUTO: 41.1 % (ref 34–46.6)
HGB BLD-MCNC: 13.9 G/DL (ref 12–15.9)
IMM GRANULOCYTES # BLD AUTO: 0.02 10*3/MM3 (ref 0–0.05)
IMM GRANULOCYTES NFR BLD AUTO: 0.4 % (ref 0–0.5)
INR PPP: 0.89 (ref 0.85–1.16)
LYMPHOCYTES # BLD AUTO: 1.69 10*3/MM3 (ref 0.7–3.1)
LYMPHOCYTES NFR BLD AUTO: 34.5 % (ref 19.6–45.3)
MCH RBC QN AUTO: 28.6 PG (ref 26.6–33)
MCHC RBC AUTO-ENTMCNC: 33.8 G/DL (ref 31.5–35.7)
MCV RBC AUTO: 84.6 FL (ref 79–97)
MONOCYTES # BLD AUTO: 0.45 10*3/MM3 (ref 0.1–0.9)
MONOCYTES NFR BLD AUTO: 9.2 % (ref 5–12)
NEUTROPHILS # BLD AUTO: 2.66 10*3/MM3 (ref 1.7–7)
NEUTROPHILS NFR BLD AUTO: 54.3 % (ref 42.7–76)
NRBC BLD AUTO-RTO: 0 /100 WBC (ref 0–0.2)
PLATELET # BLD AUTO: 311 10*3/MM3 (ref 140–450)
PMV BLD AUTO: 10.1 FL (ref 6–12)
PROTHROMBIN TIME: 11.6 SECONDS (ref 11.2–14.3)
RBC # BLD AUTO: 4.86 10*6/MM3 (ref 3.77–5.28)
VIT B12 BLD-MCNC: 372 PG/ML (ref 211–946)
WBC NRBC COR # BLD: 4.9 10*3/MM3 (ref 3.4–10.8)

## 2019-12-06 PROCEDURE — 82607 VITAMIN B-12: CPT | Performed by: FAMILY MEDICINE

## 2019-12-06 PROCEDURE — 86038 ANTINUCLEAR ANTIBODIES: CPT | Performed by: FAMILY MEDICINE

## 2019-12-06 PROCEDURE — 36415 COLL VENOUS BLD VENIPUNCTURE: CPT | Performed by: FAMILY MEDICINE

## 2019-12-06 PROCEDURE — 99214 OFFICE O/P EST MOD 30 MIN: CPT | Performed by: FAMILY MEDICINE

## 2019-12-06 PROCEDURE — 85610 PROTHROMBIN TIME: CPT | Performed by: FAMILY MEDICINE

## 2019-12-06 PROCEDURE — 84439 ASSAY OF FREE THYROXINE: CPT | Performed by: FAMILY MEDICINE

## 2019-12-06 PROCEDURE — 86431 RHEUMATOID FACTOR QUANT: CPT | Performed by: FAMILY MEDICINE

## 2019-12-06 PROCEDURE — 85652 RBC SED RATE AUTOMATED: CPT | Performed by: FAMILY MEDICINE

## 2019-12-06 PROCEDURE — 80050 GENERAL HEALTH PANEL: CPT | Performed by: FAMILY MEDICINE

## 2019-12-06 RX ORDER — ASCORBIC ACID 500 MG
500 TABLET ORAL DAILY
COMMUNITY
End: 2020-07-07
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
## 2019-12-07 LAB
ALBUMIN SERPL-MCNC: 4.4 G/DL (ref 3.5–5.2)
ALBUMIN/GLOB SERPL: 1.3 G/DL
ALP SERPL-CCNC: 135 U/L (ref 39–117)
ALT SERPL W P-5'-P-CCNC: 17 U/L (ref 1–33)
ANION GAP SERPL CALCULATED.3IONS-SCNC: 14.2 MMOL/L (ref 5–15)
AST SERPL-CCNC: 14 U/L (ref 1–32)
BILIRUB SERPL-MCNC: 0.2 MG/DL (ref 0.2–1.2)
BUN BLD-MCNC: 11 MG/DL (ref 6–20)
BUN/CREAT SERPL: 18.3 (ref 7–25)
CALCIUM SPEC-SCNC: 9.5 MG/DL (ref 8.6–10.5)
CHLORIDE SERPL-SCNC: 100 MMOL/L (ref 98–107)
CHROMATIN AB SERPL-ACNC: <10 IU/ML (ref 0–14)
CO2 SERPL-SCNC: 26.8 MMOL/L (ref 22–29)
CREAT BLD-MCNC: 0.6 MG/DL (ref 0.57–1)
GFR SERPL CREATININE-BSD FRML MDRD: 123 ML/MIN/1.73
GLOBULIN UR ELPH-MCNC: 3.3 GM/DL
GLUCOSE BLD-MCNC: 92 MG/DL (ref 65–99)
POTASSIUM BLD-SCNC: 4.5 MMOL/L (ref 3.5–5.2)
PROT SERPL-MCNC: 7.7 G/DL (ref 6–8.5)
SODIUM BLD-SCNC: 141 MMOL/L (ref 136–145)
T4 FREE SERPL-MCNC: 1.01 NG/DL (ref 0.93–1.7)
TSH SERPL DL<=0.05 MIU/L-ACNC: 1.58 UIU/ML (ref 0.27–4.2)

## 2019-12-09 ENCOUNTER — TELEPHONE (OUTPATIENT)
Dept: INTERNAL MEDICINE | Facility: CLINIC | Age: 24
End: 2019-12-09

## 2019-12-09 LAB — ANA SER QL IA: NEGATIVE

## 2020-01-05 ENCOUNTER — OFFICE VISIT (OUTPATIENT)
Dept: RETAIL CLINIC | Facility: CLINIC | Age: 25
End: 2020-01-05

## 2020-01-05 VITALS
HEART RATE: 90 BPM | TEMPERATURE: 98.1 F | RESPIRATION RATE: 20 BRPM | BODY MASS INDEX: 36.49 KG/M2 | WEIGHT: 233 LBS | OXYGEN SATURATION: 98 %

## 2020-01-05 DIAGNOSIS — J06.9 ACUTE URI: Primary | ICD-10-CM

## 2020-01-05 PROCEDURE — 99213 OFFICE O/P EST LOW 20 MIN: CPT | Performed by: NURSE PRACTITIONER

## 2020-01-05 RX ORDER — GUAIFENESIN 600 MG/1
1200 TABLET, EXTENDED RELEASE ORAL 2 TIMES DAILY
Qty: 28 TABLET | Refills: 0 | Status: SHIPPED | OUTPATIENT
Start: 2020-01-05 | End: 2020-01-12
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
## 2020-01-05 NOTE — PATIENT INSTRUCTIONS
"Upper Respiratory Infection, Adult  An upper respiratory infection (URI) affects the nose, throat, and upper air passages. URIs are caused by germs (viruses). The most common type of URI is often called \"the common cold.\"  Medicines cannot cure URIs, but you can do things at home to relieve your symptoms. URIs usually get better within 7-10 days.  Follow these instructions at home:  Activity  · Rest as needed.  · If you have a fever, stay home from work or school until your fever is gone, or until your doctor says you may return to work or school.  ? You should stay home until you cannot spread the infection anymore (you are not contagious).  ? Your doctor may have you wear a face mask so you have less risk of spreading the infection.  Relieving symptoms  · Gargle with a salt-water mixture 3-4 times a day or as needed. To make a salt-water mixture, completely dissolve ½-1 tsp of salt in 1 cup of warm water.  · Use a cool-mist humidifier to add moisture to the air. This can help you breathe more easily.  Eating and drinking    · Drink enough fluid to keep your pee (urine) pale yellow.  · Eat soups and other clear broths.  General instructions    · Take over-the-counter and prescription medicines only as told by your doctor. These include cold medicines, fever reducers, and cough suppressants.  · Do not use any products that contain nicotine or tobacco. These include cigarettes and e-cigarettes. If you need help quitting, ask your doctor.  · Avoid being where people are smoking (avoid secondhand smoke).  · Make sure you get regular shots and get the flu shot every year.  · Keep all follow-up visits as told by your doctor. This is important.  How to avoid spreading infection to others    · Wash your hands often with soap and water. If you do not have soap and water, use hand .  · Avoid touching your mouth, face, eyes, or nose.  · Cough or sneeze into a tissue or your sleeve or elbow. Do not cough or sneeze " "into your hand or into the air.  Contact a doctor if:  · You are getting worse, not better.  · You have any of these:  ? A fever.  ? Chills.  ? Brown or red mucus in your nose.  ? Yellow or brown fluid (discharge)coming from your nose.  ? Pain in your face, especially when you bend forward.  ? Swollen neck glands.  ? Pain with swallowing.  ? White areas in the back of your throat.  Get help right away if:  · You have shortness of breath that gets worse.  · You have very bad or constant:  ? Headache.  ? Ear pain.  ? Pain in your forehead, behind your eyes, and over your cheekbones (sinus pain).  ? Chest pain.  · You have long-lasting (chronic) lung disease along with any of these:  ? Wheezing.  ? Long-lasting cough.  ? Coughing up blood.  ? A change in your usual mucus.  · You have a stiff neck.  · You have changes in your:  ? Vision.  ? Hearing.  ? Thinking.  ? Mood.  Summary  · An upper respiratory infection (URI) is caused by a germ called a virus. The most common type of URI is often called \"the common cold.\"  · URIs usually get better within 7-10 days.  · Take over-the-counter and prescription medicines only as told by your doctor.  This information is not intended to replace advice given to you by your health care provider. Make sure you discuss any questions you have with your health care provider.  Document Released: 06/05/2009 Document Revised: 08/10/2018 Document Reviewed: 08/10/2018  [employer] Patient Education © 2019 [employer]    "

## 2020-01-09 ENCOUNTER — TELEPHONE (OUTPATIENT)
Dept: OBSTETRICS AND GYNECOLOGY | Facility: CLINIC | Age: 25
End: 2020-01-09

## 2020-01-09 RX ORDER — FLUCONAZOLE 150 MG/1
150 TABLET ORAL DAILY
Qty: 1 TABLET | Refills: 0 | Status: SHIPPED | OUTPATIENT
Start: 2020-01-09 | End: 2020-03-27 | Stop reason: SDUPTHER

## 2020-01-09 NOTE — TELEPHONE ENCOUNTER
Pt called and stated she think she has a yeast infection, itching with slight discharge already tried OTC medication and it did not work. Pt was notified that we would send something in and if it did not work she needs to make appt to be seen

## 2020-01-28 ENCOUNTER — APPOINTMENT (OUTPATIENT)
Dept: NEUROLOGY | Facility: HOSPITAL | Age: 25
End: 2020-01-28

## 2020-03-03 ENCOUNTER — HOSPITAL ENCOUNTER (OUTPATIENT)
Dept: NEUROLOGY | Facility: HOSPITAL | Age: 25
Discharge: HOME | End: 2020-03-03
Admitting: FAMILY MEDICINE

## 2020-03-03 DIAGNOSIS — R20.0 NUMBNESS AND TINGLING OF RIGHT LEG: ICD-10-CM

## 2020-03-03 DIAGNOSIS — R20.2 NUMBNESS AND TINGLING OF RIGHT LEG: ICD-10-CM

## 2020-03-03 DIAGNOSIS — R20.0 NUMBNESS OF RIGHT HAND: ICD-10-CM

## 2020-03-03 PROCEDURE — 95912 NRV CNDJ TEST 11-12 STUDIES: CPT

## 2020-03-03 PROCEDURE — 95886 MUSC TEST DONE W/N TEST COMP: CPT

## 2020-03-27 ENCOUNTER — TELEPHONE (OUTPATIENT)
Dept: OBSTETRICS AND GYNECOLOGY | Facility: CLINIC | Age: 25
End: 2020-03-27

## 2020-03-27 RX ORDER — FLUCONAZOLE 150 MG/1
150 TABLET ORAL DAILY
Qty: 1 TABLET | Refills: 0 | Status: SHIPPED | OUTPATIENT
Start: 2020-03-27 | End: 2020-04-02 | Stop reason: SDUPTHER

## 2020-03-27 NOTE — TELEPHONE ENCOUNTER
Provider Name  Dr Borden  Reason for Call  Having abdominal and vaginal itching, thinks she may have yeast infection  Pharmacy Name  [facility] in [place], [place]  Call Back Number  [phone]

## 2020-03-27 NOTE — TELEPHONE ENCOUNTER
Have sent prescription for Diflucan.  If this does not help, let us know.    New Medications Ordered This Visit   Medications   • fluconazole (Diflucan) 150 MG tablet     Sig: Take 1 tablet by mouth Daily.     Dispense:  1 tablet     Refill:  0

## 2020-04-02 RX ORDER — FLUCONAZOLE 150 MG/1
150 TABLET ORAL DAILY
Qty: 2 TABLET | Refills: 0 | Status: SHIPPED | OUTPATIENT
Start: 2020-04-02 | End: 2020-04-04

## 2020-06-23 ENCOUNTER — TELEPHONE (OUTPATIENT)
Dept: OBSTETRICS AND GYNECOLOGY | Facility: CLINIC | Age: 25
End: 2020-06-23

## 2020-06-23 ENCOUNTER — LAB (OUTPATIENT)
Dept: LAB | Facility: HOSPITAL | Age: 25
End: 2020-06-23

## 2020-06-23 DIAGNOSIS — N30.01 ACUTE CYSTITIS WITH HEMATURIA: ICD-10-CM

## 2020-06-23 DIAGNOSIS — N30.01 ACUTE CYSTITIS WITH HEMATURIA: Primary | ICD-10-CM

## 2020-06-23 LAB
BILIRUB UR QL STRIP: NEGATIVE
CLARITY UR: CLEAR
COLOR UR: YELLOW
GLUCOSE UR STRIP-MCNC: NEGATIVE MG/DL
HGB UR QL STRIP.AUTO: NEGATIVE
KETONES UR QL STRIP: NEGATIVE
LEUKOCYTE ESTERASE UR QL STRIP.AUTO: NEGATIVE
NITRITE UR QL STRIP: NEGATIVE
PH UR STRIP.AUTO: 6 [PH] (ref 5–8)
PROT UR QL STRIP: NEGATIVE
SP GR UR STRIP: 1.02 (ref 1–1.03)
UROBILINOGEN UR QL STRIP: NORMAL

## 2020-06-23 PROCEDURE — 81003 URINALYSIS AUTO W/O SCOPE: CPT

## 2020-06-23 RX ORDER — FLUCONAZOLE 150 MG/1
150 TABLET ORAL DAILY
Qty: 1 TABLET | Refills: 0 | Status: SHIPPED | OUTPATIENT
Start: 2020-06-23 | End: 2020-07-07

## 2020-06-23 NOTE — TELEPHONE ENCOUNTER
Pt stated that she thinks she has a uti and yest infection, pt has itching on the inside of her vagina and it burns when she uses the bathroom, Pt was advised to go to the lab and give urine sample,

## 2020-07-07 ENCOUNTER — OFFICE VISIT (OUTPATIENT)
Dept: OBSTETRICS AND GYNECOLOGY | Facility: CLINIC | Age: 25
End: 2020-07-07

## 2020-07-07 VITALS
SYSTOLIC BLOOD PRESSURE: 116 MMHG | BODY MASS INDEX: 36.18 KG/M2 | RESPIRATION RATE: 14 BRPM | WEIGHT: 231 LBS | DIASTOLIC BLOOD PRESSURE: 68 MMHG

## 2020-07-07 DIAGNOSIS — Z30.45 ENCOUNTER FOR SURVEILLANCE OF TRANSDERMAL PATCH HORMONAL CONTRACEPTIVE DEVICE: Primary | ICD-10-CM

## 2020-07-07 PROCEDURE — 99213 OFFICE O/P EST LOW 20 MIN: CPT | Performed by: OBSTETRICS & GYNECOLOGY

## 2020-07-07 NOTE — PROGRESS NOTES
Subjective   Chief Complaint   Patient presents with   • Mass     on vagina       Alicia Rey is a 25 y.o. year old .  Patient's last menstrual period was 2020 (exact date).  She presents because of a bump she is noticed on the outside of her vulva.  It comes and goes.  She comes today to have this checked but the valve is no longer present.  Like to talk about birth control options.  She is been prescribed the minipill.  She continues to take it unpredictably and forgets it.  She is not interested in an IUD because she has concerns.  She did not like Nexplanon or Depo-Provera.    The following portions of the patient's history were reviewed and updated as appropriate:no additional history reviewed    Social History    Tobacco Use      Smoking status: Former Smoker        Packs/day: 0.50        Years: 7.00        Pack years: 3.5        Start date:         Quit date: 2018        Years since quittin.9      Smokeless tobacco: Never Used         Objective   /68   Resp 14   Wt 105 kg (231 lb)   LMP 2020 (Exact Date)   Breastfeeding No   BMI 36.18 kg/m²     Lab Review   No data reviewed    Imaging   No data reviewed        Assessment   1. Contraceptive counseling  2. Subjective vulvar lesion resolved     Plan   1. At this point I think her only options for contraception that she would would be willing to consider would be NuvaRing or Ortho Evra.  I do not think an IUD is contraindicated.  In fact I think this would be a good option but she has heard about embedding of the IUD and at this point is not interested  2. The importance of keeping all planned follow-up and taking all medications as prescribed was emphasized.  3. Follow up PRN     New Medications Ordered This Visit   Medications   • norelgestromin-ethinyl estradiol (ORTHO EVRA) 150-35 MCG/24HR     Sig: Place 1 patch on the skin as directed by provider 1 (One) Time Per Week.     Dispense:  3 patch     Refill:  6           This note was electronically signed.    Prosper Borden M.D.  July 7, 2020    Total time spent today with Alicia  was 20 minutes (level 3).  Off this time, 90% was spent face-to-face time coordinating care, answering her questions and counseling regarding contraceptive choices and efficacy rates.    Note: Speech recognition transcription software may have been used to create portions of this document.  An attempt at proofreading has been made but errors in transcription could still be present.

## 2020-07-15 ENCOUNTER — TELEPHONE (OUTPATIENT)
Dept: OBSTETRICS AND GYNECOLOGY | Facility: CLINIC | Age: 25
End: 2020-07-15

## 2020-07-15 NOTE — TELEPHONE ENCOUNTER
Patch came off in shower last night placed another on today but not due to fill prescription for 2 weeks.  Patient unsure what to do.  Advise patient she may ask for early refill but may have to pay out of pocket for prescription

## 2020-07-15 NOTE — TELEPHONE ENCOUNTER
Dr Borden     Patient calling to see what she should do since her birth control patch came off in the shower last night.     Pt call back [phone]

## 2020-10-05 ENCOUNTER — LAB (OUTPATIENT)
Dept: LAB | Facility: HOSPITAL | Age: 25
End: 2020-10-05

## 2020-10-05 DIAGNOSIS — Z32.01 POSITIVE PREGNANCY TEST: ICD-10-CM

## 2020-10-05 DIAGNOSIS — Z32.01 POSITIVE PREGNANCY TEST: Primary | ICD-10-CM

## 2020-10-05 LAB — HCG INTACT+B SERPL-ACNC: <0.5 MIU/ML

## 2020-10-05 PROCEDURE — 84702 CHORIONIC GONADOTROPIN TEST: CPT

## 2020-10-05 PROCEDURE — 36415 COLL VENOUS BLD VENIPUNCTURE: CPT

## 2020-10-16 ENCOUNTER — TELEPHONE (OUTPATIENT)
Dept: OBSTETRICS AND GYNECOLOGY | Facility: CLINIC | Age: 25
End: 2020-10-16

## 2020-10-16 RX ORDER — FLUCONAZOLE 150 MG/1
150 TABLET ORAL DAILY
Qty: 1 TABLET | Refills: 0 | Status: SHIPPED | OUTPATIENT
Start: 2020-10-16 | End: 2020-12-11 | Stop reason: SDUPTHER

## 2020-10-16 NOTE — TELEPHONE ENCOUNTER
It is done    New Medications Ordered This Visit   Medications   • fluconazole (Diflucan) 150 MG tablet     Sig: Take 1 tablet by mouth Daily.     Dispense:  1 tablet     Refill:  0

## 2020-10-16 NOTE — TELEPHONE ENCOUNTER
PATIENT IS CALLING BECAUSE SHE HAS A YEAST INFECTION. WOULD LIKE A RX CALLED IN TO PHARMACY ([facility])

## 2020-10-19 ENCOUNTER — TELEPHONE (OUTPATIENT)
Dept: OBSTETRICS AND GYNECOLOGY | Facility: CLINIC | Age: 25
End: 2020-10-19

## 2020-10-19 NOTE — TELEPHONE ENCOUNTER
PATIENT CALLED AND MESSAGE WAS TRANSFER TO TRIAGE TO [facility].     CLOSING ENCOUNTER      THANK YOU

## 2020-12-11 ENCOUNTER — TELEPHONE (OUTPATIENT)
Dept: OBSTETRICS AND GYNECOLOGY | Facility: CLINIC | Age: 25
End: 2020-12-11

## 2020-12-11 RX ORDER — FLUCONAZOLE 150 MG/1
150 TABLET ORAL DAILY
Qty: 1 TABLET | Refills: 0 | Status: SHIPPED | OUTPATIENT
Start: 2020-12-11 | End: 2020-12-29

## 2020-12-11 NOTE — TELEPHONE ENCOUNTER
NIDIA    Patient called and stated she has a bad yeast infection.  She said she tried taking Monistat and it is not working and would like to know if something could be called in for her.  Please call and advise.  Thank you.

## 2020-12-11 NOTE — TELEPHONE ENCOUNTER
Done    New Medications Ordered This Visit   Medications   • fluconazole (Diflucan) 150 MG tablet     Sig: Take 1 tablet by mouth Daily.     Dispense:  1 tablet     Refill:  0

## 2020-12-11 NOTE — TELEPHONE ENCOUNTER
I can call in oral Diflucan or vaginal Terazol.  Diflucan is a 1 pill 1 time.  Terazol is a suppository nightly for 3 nights.  The Terazol will work better more times than not.  Just a little more messy.  Can you find out which one she wants to do?  Thank you.

## 2020-12-29 ENCOUNTER — OFFICE VISIT (OUTPATIENT)
Dept: OBSTETRICS AND GYNECOLOGY | Facility: CLINIC | Age: 25
End: 2020-12-29

## 2020-12-29 VITALS — WEIGHT: 232 LBS | BODY MASS INDEX: 36.34 KG/M2 | RESPIRATION RATE: 14 BRPM

## 2020-12-29 DIAGNOSIS — Z30.49 ENCOUNTER FOR SURVEILLANCE OF OTHER CONTRACEPTIVE: Primary | ICD-10-CM

## 2020-12-29 PROCEDURE — 99213 OFFICE O/P EST LOW 20 MIN: CPT | Performed by: OBSTETRICS & GYNECOLOGY

## 2021-01-02 PROBLEM — Z97.5 IUD (INTRAUTERINE DEVICE) IN PLACE: Status: ACTIVE | Noted: 2021-01-02

## 2021-01-02 PROBLEM — Z30.431 CHECKING OF INTRAUTERINE DEVICE: Status: ACTIVE | Noted: 2021-01-02

## 2021-01-02 PROBLEM — Z30.431 CHECKING OF INTRAUTERINE DEVICE: Status: RESOLVED | Noted: 2021-01-02 | Resolved: 2021-01-02

## 2021-01-02 NOTE — PROGRESS NOTES
IUD Insertion    Patient's last menstrual period was 12/28/2020 (exact date).    Date of procedure:  1/2/2021    Risks and benefits discussed? yes  All questions answered? yes  Consents given by The patient  Written consent obtained? yes    Local anesthesia used:  no    Procedure documentation:    After verifying the patient had a low probability of being pregnant and met the criteria for insertion, a sterile speculum has placed and the cervix was cleansed with an antiseptic solution.  Vaginal discharge was scant.  The anterior lip of the cervix was grasped with a tenaculum and the uterine cavity was gently sounded. There was no difficulty passing the sound through the cervix.  Cervical dilation did not need to be performed prior to placing the IUD.  The uterus was anteverted and sounded to 8 cms.  The Mirena was then prepared per the manufacturers instructions.    The Mirena was advanced to a point 2 cms from the fundus and then the arms were released from the sheath.  The device was advanced to the fundus and the device was released fully from the sheath. The string was cut 2 cms in length.  Bleeding from the cervix was scant.    She tolerated the procedure without any difficulty.     Post procedure instructions: It was reviewed that the Mirena will not alter the timing of when she bleeds but it may decrease the quantity of flow and cramps.  Roughly 30% of people will be amenorrheic over time.  Efficacy rate of 99.2% over 6 years was discussed.  Spontaneous expulsion rate of 1-2% was also discussed.  If she has any issue with fever or excessive bleeding or pain she is to call the office immediately.  Otherwise I would like to see her back in 6 weeks with an ultrasound to confirm correct placement.    This note was electronically signed.    Prosper Borden M.D.  January 2, 2021

## 2021-01-04 ENCOUNTER — OFFICE VISIT (OUTPATIENT)
Dept: OBSTETRICS AND GYNECOLOGY | Facility: CLINIC | Age: 26
End: 2021-01-04

## 2021-01-04 VITALS — RESPIRATION RATE: 14 BRPM | BODY MASS INDEX: 36.34 KG/M2 | WEIGHT: 232 LBS

## 2021-01-04 DIAGNOSIS — Z30.430 ENCOUNTER FOR INTRAUTERINE DEVICE PLACEMENT: Primary | ICD-10-CM

## 2021-01-04 DIAGNOSIS — Z97.5 IUD (INTRAUTERINE DEVICE) IN PLACE: ICD-10-CM

## 2021-01-04 PROCEDURE — 58300 INSERT INTRAUTERINE DEVICE: CPT | Performed by: OBSTETRICS & GYNECOLOGY

## 2021-01-22 ENCOUNTER — TELEPHONE (OUTPATIENT)
Dept: OBSTETRICS AND GYNECOLOGY | Facility: CLINIC | Age: 26
End: 2021-01-22

## 2021-01-22 NOTE — TELEPHONE ENCOUNTER
DR. JACOB         PATIENT CALLED STATES THAT SHE HAD AN IUD PLACED A MONTH AGO.  AND SHE IS SEEING BLOOD. SHE STATES IT LOOKS LIKE OLD BLOOD.   AND IT STARTED 7 DAY AGO AND HAS NOT STOPPED WANTS TO KNOW IF THIS IS NORMAL AND STATES HER PERIOD REAL MENES. WILL START IN ABOUT WEEK.        PATIENT IS CONCERN WANTS TO SPEAK TO SOMEONE.       PLEASE CALL      THANK YOU

## 2021-01-22 NOTE — TELEPHONE ENCOUNTER
Clarisse c/o's irregular bleeding since having IUD placed 4 weeks ago.  Advised her bleeding could continue for up to 4 months post IUD placement. Voices understanding.

## 2021-02-19 ENCOUNTER — OFFICE VISIT (OUTPATIENT)
Dept: OBSTETRICS AND GYNECOLOGY | Facility: CLINIC | Age: 26
End: 2021-02-19

## 2021-02-19 VITALS — BODY MASS INDEX: 36.18 KG/M2 | WEIGHT: 231 LBS | RESPIRATION RATE: 14 BRPM

## 2021-02-19 DIAGNOSIS — Z30.431 CHECKING OF INTRAUTERINE DEVICE: Primary | ICD-10-CM

## 2021-02-19 PROCEDURE — 99212 OFFICE O/P EST SF 10 MIN: CPT | Performed by: OBSTETRICS & GYNECOLOGY

## 2021-02-19 NOTE — PROGRESS NOTES
Pt advised on results    Subjective   Chief Complaint   Patient presents with   • Follow-up     Alicia Rey is a 25 y.o. year old  presenting to be seen for follow-up of her recently placed Mirena.  Since the time of insertion she has been sexually active.  [place] has not been painful for her.   [place] has not been painful for her partner.    OTHER THINGS SHE WANTS TO DISCUSS TODAY:  Nothing else       Objective   Resp 14   Wt 105 kg (231 lb)   BMI 36.18 kg/m²     Imaging   Pelvic ultrasound images independantly reviewed - IUD is appropriately positioned in the fundus with the arms open correctly       Assessment   1. Normal position of IUD     Plan   1. The importance of keeping all planned follow-up and taking all medications as prescribed was emphasized.  2. Follow up PRN          Total time spent today with Alicia  was 10-19 minutes (level 2).  Greater than 50% of the time was spent coordinating care, answering her questions and counseling regarding pathophysiology of her presenting problem along with plans for any diagnositc work-up and treatment.    This note was electronically signed.    Prosper Borden M.D.  2021    Note: Speech recognition transcription software may have been used to create portions of this document.  An attempt at proofreading has been made but errors in transcription could still be present.

## 2021-06-18 ENCOUNTER — TELEPHONE (OUTPATIENT)
Dept: ULTRASOUND IMAGING | Facility: CLINIC | Age: 26
End: 2021-06-18

## 2021-06-18 RX ORDER — FLUCONAZOLE 150 MG/1
150 TABLET ORAL DAILY
Qty: 1 TABLET | Refills: 0 | OUTPATIENT
Start: 2021-06-18 | End: 2021-06-19

## 2021-06-18 NOTE — TELEPHONE ENCOUNTER
Pt called in to see if a RX for diclofenac can be called in. Dealing with a bad yeast infection. OTC didn't work

## 2021-06-18 NOTE — TELEPHONE ENCOUNTER
Done    New Medications Ordered This Visit   Medications   • fluconazole (Diflucan) 150 MG tablet     Sig: Take 1 tablet by mouth Daily for 1 day.     Dispense:  1 tablet     Refill:  0

## 2021-07-15 ENCOUNTER — TELEPHONE (OUTPATIENT)
Dept: OBSTETRICS AND GYNECOLOGY | Facility: CLINIC | Age: 26
End: 2021-07-15

## 2021-07-15 RX ORDER — FLUCONAZOLE 150 MG/1
150 TABLET ORAL DAILY
Qty: 1 TABLET | Refills: 0 | Status: SHIPPED | OUTPATIENT
Start: 2021-07-15 | End: 2021-07-16

## 2021-07-15 NOTE — TELEPHONE ENCOUNTER
sunil    Pt needs diflucan called in. Having symptoms of itching on outside vagina.    [facility] in [place], [place]

## 2021-10-01 ENCOUNTER — TELEPHONE (OUTPATIENT)
Dept: OBSTETRICS AND GYNECOLOGY | Facility: CLINIC | Age: 26
End: 2021-10-01

## 2021-10-01 NOTE — TELEPHONE ENCOUNTER
DR JACOB PT    HAVING YEAST INFECTION TRIED OTC MONISTAT     SYMPTOMS ARE REDNESS, ITCHING, WHITE DISCHARGE, ODOR     [phone]

## 2021-10-01 NOTE — TELEPHONE ENCOUNTER
Lets try this and see if it helps.  If it does not help she may need to get back a hold of us to be tested for things such as sexually transmitted diseases.    New Medications Ordered This Visit   Medications   • terconazole (TERAZOL 3) 0.8 % vaginal cream     Sig: Insert 1 applicator into the vagina Every Night for 3 days.     Dispense:  3 each     Refill:  0

## 2021-10-01 NOTE — TELEPHONE ENCOUNTER
Spoke with patient and advised her that Dr. Borden sent in a prescription for terazol cream to her pharmacy.  Patient stated that she is afraid that some bacteria from her anus got into her vaginal canal and she would like a pill instead of a cream.    Please advise.

## 2021-10-01 NOTE — TELEPHONE ENCOUNTER
Spoke with patient and advised her that the Terazol would be more effective treatment if it is a yeast infection.  Patient verbalized understanding and had no questions at this time.

## 2021-10-05 ENCOUNTER — TELEPHONE (OUTPATIENT)
Dept: OBSTETRICS AND GYNECOLOGY | Facility: CLINIC | Age: 26
End: 2021-10-05

## 2021-10-05 NOTE — TELEPHONE ENCOUNTER
Try this instead    New Medications Ordered This Visit   Medications   • terconazole (Terazol 7) 0.4 % vaginal cream     Sig: Insert 1 applicator into the vagina Every Night for 7 days.     Dispense:  45 g     Refill:  0

## 2021-10-05 NOTE — TELEPHONE ENCOUNTER
PT called stating that her pharmacy is out of the yeast infection medicine Dr. Hale prescribed, with no stock date.  Is there something else is can give her.

## 2021-12-28 ENCOUNTER — TELEPHONE (OUTPATIENT)
Dept: OBSTETRICS AND GYNECOLOGY | Facility: CLINIC | Age: 26
End: 2021-12-28

## 2021-12-28 NOTE — TELEPHONE ENCOUNTER
Spoke with Clarisse, she is reporting two unprotected sexual encounters in the last 2 weeks. Has been noticing increased dischsrge with an unusual odor, denies itching or burning. Scheduled for appointment 12/30/21.

## 2021-12-28 NOTE — TELEPHONE ENCOUNTER
PT called stating she thinks she has BV or maybe a yeast infection, she also has an IUD so she wants to see a dr this week.  I advised her we didn't currently have any available appointmetns.  Please contact her.

## 2022-01-13 ENCOUNTER — LAB (OUTPATIENT)
Dept: LAB | Facility: HOSPITAL | Age: 27
End: 2022-01-13

## 2022-01-13 ENCOUNTER — OFFICE VISIT (OUTPATIENT)
Dept: OBSTETRICS AND GYNECOLOGY | Facility: CLINIC | Age: 27
End: 2022-01-13

## 2022-01-13 VITALS — RESPIRATION RATE: 14 BRPM | BODY MASS INDEX: 37.43 KG/M2 | WEIGHT: 239 LBS

## 2022-01-13 DIAGNOSIS — Z11.8 SCREENING FOR CHLAMYDIAL DISEASE: ICD-10-CM

## 2022-01-13 DIAGNOSIS — Z12.4 SCREENING FOR CERVICAL CANCER: ICD-10-CM

## 2022-01-13 DIAGNOSIS — N92.6 IRREGULAR MENSES: ICD-10-CM

## 2022-01-13 DIAGNOSIS — N92.6 IRREGULAR MENSES: Primary | ICD-10-CM

## 2022-01-13 PROCEDURE — 99214 OFFICE O/P EST MOD 30 MIN: CPT | Performed by: OBSTETRICS & GYNECOLOGY

## 2022-01-13 PROCEDURE — 84702 CHORIONIC GONADOTROPIN TEST: CPT

## 2022-01-13 RX ORDER — OXCARBAZEPINE 150 MG/1
150 TABLET, FILM COATED ORAL 2 TIMES DAILY
COMMUNITY
Start: 2022-01-03

## 2022-01-13 NOTE — PROGRESS NOTES
Ultrasound shows correct placement of IUD  Ovaries unremarkable bilaterally      Prosper Borden M.D.  January 13, 2022  15:06 EST

## 2022-01-13 NOTE — PROGRESS NOTES
Subjective   Chief Complaint   Patient presents with   • Vaginal Bleeding     with odor     Alicia Rey is a 26 y.o. year old .  Patient's last menstrual period was 2021 (approximate).  She has had new onset abnormal bleeding.  1 month ago her cycles have been predictably normal and light.  Over the last month she has been bleeding almost every day.  There is a little bit of pain.  There is a little tissue that she recognizes.  Additionally, she like to be STD screen.    OTHER THINGS SHE WANTS TO DISCUSS TODAY:  Nothing else    The following portions of the patient's history were reviewed and updated as appropriate:current medications and allergies    Social History    Tobacco Use      Smoking status: Former Smoker        Packs/day: 0.50        Years: 7.00        Pack years: 3.5        Start date:         Quit date: 2018        Years since quitting: 3.4      Smokeless tobacco: Never Used    Review of Systems  Constitutional POS: nothing reported    NEG: anorexia or night sweats   Genitourinary POS: nothing reported    NEG: dysuria or hematuria   Gastointestinal POS: nothing reported    NEG: bloating, change in bowel habits, melena or reflux symptoms   Integument POS: nothing reported    NEG: moles that are changing in size, shape, color or rashes   Breast POS: nothing reported    NEG: persistent breast lump, skin dimpling or nipple discharge         Objective   Resp 14   Wt 108 kg (239 lb)   LMP 2021 (Approximate)   Breastfeeding No   BMI 37.43 kg/m²     General:  well developed; well nourished  no acute distress   Pelvis: Clinical staff was present for exam  External genitalia:  normal appearance of the external genitalia including Bartholin's and Clarkrange's glands.  :  urethral meatus normal;  Vaginal:  normal pink mucosa without prolapse or lesions. discharge present -  bloody;  Cervix:  normal appearance. IUD string not seen;     Lab Review   No data reviewed    Imaging   No  data reviewed        Assessment   1. Irregular menses.  This is a new problem that does require additional work-up     Plan   1. Pap and STD testing was done today.  If she does not receive the results of the Pap within 2 weeks  time, she was instructed to call to find out the results.  I explained to Alicia that the recommendations for Pap smear interval in a low risk patient's has lengthened to 3 years time.  I encouraged her to be seen yearly for a full physical exam including breast and pelvic exam even during the off years when PAP's will not be performed.  2. The following tests were ordered today: HCG.  It was explained to Alicia that all lab test should be back within the one week after they are performed. She will be notified about the results, regardless of the findings. If she has not been contacted by the office within 2 weeks after the test has been performed, it is her responsibility to contact us to learn about her results.   3. Ultrasound needs to be done any time that is convenient for her.   4. The importance of keeping all planned follow-up and taking all medications as prescribed was emphasized.  5. Follow up after ultrasound          This note was electronically signed.    Prosper Borden M.D.  January 13, 2022    Part of this note may be an electronic transcription/translation of spoken language to printed text using the Dragon Dictation System.

## 2022-01-14 LAB — HCG INTACT+B SERPL-ACNC: <0.5 MIU/ML

## 2022-01-20 ENCOUNTER — TELEPHONE (OUTPATIENT)
Dept: OBSTETRICS AND GYNECOLOGY | Facility: CLINIC | Age: 27
End: 2022-01-20

## 2022-01-20 RX ORDER — ESTRADIOL 1 MG/1
1 TABLET ORAL DAILY
Qty: 10 TABLET | Refills: 0 | Status: SHIPPED | OUTPATIENT
Start: 2022-01-20 | End: 2022-04-08

## 2022-01-24 ENCOUNTER — TELEPHONE (OUTPATIENT)
Dept: OBSTETRICS AND GYNECOLOGY | Facility: CLINIC | Age: 27
End: 2022-01-24

## 2022-03-23 ENCOUNTER — TELEPHONE (OUTPATIENT)
Dept: INTERNAL MEDICINE | Facility: CLINIC | Age: 27
End: 2022-03-23

## 2022-04-08 ENCOUNTER — OFFICE VISIT (OUTPATIENT)
Dept: INTERNAL MEDICINE | Facility: CLINIC | Age: 27
End: 2022-04-08

## 2022-04-08 VITALS
WEIGHT: 254 LBS | OXYGEN SATURATION: 96 % | HEART RATE: 83 BPM | BODY MASS INDEX: 37.62 KG/M2 | DIASTOLIC BLOOD PRESSURE: 76 MMHG | SYSTOLIC BLOOD PRESSURE: 118 MMHG | TEMPERATURE: 97.3 F | HEIGHT: 69 IN

## 2022-04-08 DIAGNOSIS — B37.0 THRUSH, ORAL: ICD-10-CM

## 2022-04-08 DIAGNOSIS — J30.9 ALLERGIC RHINITIS, UNSPECIFIED SEASONALITY, UNSPECIFIED TRIGGER: ICD-10-CM

## 2022-04-08 DIAGNOSIS — M70.61 TROCHANTERIC BURSITIS OF RIGHT HIP: Primary | ICD-10-CM

## 2022-04-08 DIAGNOSIS — Z83.3 FAMILY HISTORY OF DIABETES MELLITUS (DM): ICD-10-CM

## 2022-04-08 LAB
EXPIRATION DATE: NORMAL
HBA1C MFR BLD: 4.9 %
Lab: NORMAL

## 2022-04-08 PROCEDURE — 99214 OFFICE O/P EST MOD 30 MIN: CPT | Performed by: FAMILY MEDICINE

## 2022-04-08 PROCEDURE — 83036 HEMOGLOBIN GLYCOSYLATED A1C: CPT | Performed by: FAMILY MEDICINE

## 2022-04-08 RX ORDER — PRAZOSIN HYDROCHLORIDE 1 MG/1
1 CAPSULE ORAL NIGHTLY
COMMUNITY
Start: 2022-04-03

## 2022-04-08 RX ORDER — FLUTICASONE PROPIONATE 50 MCG
2 SPRAY, SUSPENSION (ML) NASAL DAILY
Qty: 16 G | Refills: 3 | Status: SHIPPED | OUTPATIENT
Start: 2022-04-08

## 2022-04-08 RX ORDER — METHOCARBAMOL 500 MG/1
TABLET, FILM COATED ORAL
COMMUNITY
Start: 2022-03-07 | End: 2022-07-11

## 2022-04-08 RX ORDER — CLOTRIMAZOLE 10 MG/1
LOZENGE ORAL; TOPICAL
COMMUNITY
Start: 2022-03-25 | End: 2022-07-11
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
## 2022-05-19 ENCOUNTER — TELEPHONE (OUTPATIENT)
Dept: OBSTETRICS AND GYNECOLOGY | Facility: CLINIC | Age: 27
End: 2022-05-19

## 2022-05-19 NOTE — TELEPHONE ENCOUNTER
NIDIA      PT IS HAVING UNEXPECTED BLEEDING AGAIN. NOW HAS A YEAST INFECTION FROM IT. TRIED MONISTAT, NOT WORKING.      PHARM:  [facility] [address]

## 2022-05-19 NOTE — TELEPHONE ENCOUNTER
We can call in something for what she perceives to be a yeast infection.  If she is wanting something related to the bleeding, that is probably a discussion we need to have face-to-face.    New Medications Ordered This Visit   Medications   • terconazole (TERAZOL 3) 0.8 % vaginal cream     Sig: Insert 1 applicator into the vagina Every Night for 3 days.     Dispense:  3 each     Refill:  0

## 2022-06-15 ENCOUNTER — TELEMEDICINE (OUTPATIENT)
Dept: INTERNAL MEDICINE | Facility: CLINIC | Age: 27
End: 2022-06-15

## 2022-06-15 DIAGNOSIS — R51.9 ACUTE NONINTRACTABLE HEADACHE, UNSPECIFIED HEADACHE TYPE: Primary | ICD-10-CM

## 2022-06-15 PROCEDURE — 99213 OFFICE O/P EST LOW 20 MIN: CPT | Performed by: FAMILY MEDICINE

## 2022-06-15 NOTE — PROGRESS NOTES
Mode of Visit: Video  Location of patient: home  You have chosen to receive care through a telehealth visit.  Does the patient consent to use a video/audio connection for your medical care today? Yes  The visit included audio and video interaction. No technical issues occurred during this visit.         Subjective   Alicia Rey is a 27 y.o. female.     History of Present Illness     Video Visit was done today because of COVID-19.  patient has consented to receive care via Video Visit   Patient location ;    [place]; body shaking     She is c/o G body shaking, HA , FEELS AS IF SHE IS in earthquake, it happens many times. No relation with eating as it happened before and after she eats .   No sick contact   No F,C, vision changes   Currently  , She is at ER now AT [facility] , they are doing blood work , all looks ok ,pending CT head.   Her Vital signs are normal       Current Outpatient Medications on File Prior to Visit   Medication Sig Dispense Refill   • clotrimazole (MYCELEX) 10 MG gab ALLOW 1 GAB TO DISSOLVE SLOWLY BY MOUTH FOUR TIMES DAILY     • fluticasone (Flonase) 50 MCG/ACT nasal spray 2 sprays into the nostril(s) as directed by provider Daily. 16 g 3   • methocarbamol (ROBAXIN) 500 MG tablet TAKE 2 TABLETS BY MOUTH FOUR TIMES DAILY FOR 7 DAYS     • nystatin (MYCOSTATIN) 100,000 unit/mL suspension SHAKE LIQUID AND TAKE 6 ML BY MOUTH FOUR TIMES DAILY FOR 14 DAYS     • OXcarbazepine (TRILEPTAL) 150 MG tablet 150 mg 2 (Two) Times a Day.     • prazosin (MINIPRESS) 1 MG capsule Take 1 capsule by mouth Every Night.       No current facility-administered medications on file prior to visit.       The following portions of the patient's history were reviewed and updated as appropriate: allergies, current medications, past family history, past medical history, past social history, past surgical history and problem list.    Review of Systems   Neurological: Positive for headache.       Objective    There were no vitals taken for this visit.  Physical Exam  Constitutional:       General: She is not in acute distress.     Appearance: She is not ill-appearing, toxic-appearing or diaphoretic.   Neurological:      Mental Status: She is alert and oriented to person, place, and time.   Psychiatric:         Mood and Affect: Mood normal.         Behavior: Behavior normal.         Thought Content: Thought content normal.           Assessment & Plan   Diagnoses and all orders for this visit:    1. Acute nonintractable headache, unspecified headache type (Primary);  - Currently she is at ER  -  all her labs are normal per pt  - Pending CT head  - Advised to stay at ER till get CT head , needs to follow ER Dr instructions         I have reviewed the limitations of a telehealth visit (such as lack of a physical exam and unable to obtain vital signs) and advised the patient that they may need to follow up for an office visit should their symptoms or concerns persist, worsen, or change.  Patient was encouraged to keep me informed of any acute changes, lack of improvement, or any new concerning symptoms.   I discussed my findings,recommendations, and plan of care was with the patient. They verbalized understanding and agreement.

## 2022-07-11 ENCOUNTER — OFFICE VISIT (OUTPATIENT)
Dept: OBSTETRICS AND GYNECOLOGY | Facility: CLINIC | Age: 27
End: 2022-07-11

## 2022-07-11 VITALS
WEIGHT: 249 LBS | HEIGHT: 69 IN | BODY MASS INDEX: 36.88 KG/M2 | DIASTOLIC BLOOD PRESSURE: 80 MMHG | SYSTOLIC BLOOD PRESSURE: 120 MMHG

## 2022-07-11 DIAGNOSIS — N89.8 VAGINAL IRRITATION: Primary | ICD-10-CM

## 2022-07-11 DIAGNOSIS — N92.6 IRREGULAR MENSTRUAL BLEEDING: ICD-10-CM

## 2022-07-11 PROCEDURE — 99213 OFFICE O/P EST LOW 20 MIN: CPT | Performed by: NURSE PRACTITIONER

## 2022-07-11 RX ORDER — OXCARBAZEPINE 150 MG/1
150 TABLET, FILM COATED ORAL
COMMUNITY
Start: 2022-05-03 | End: 2022-07-11 | Stop reason: SDUPTHER

## 2022-07-11 RX ORDER — AMITRIPTYLINE HYDROCHLORIDE 25 MG/1
TABLET, FILM COATED ORAL
COMMUNITY
Start: 2022-06-22 | End: 2023-03-06 | Stop reason: SDUPTHER

## 2022-07-11 RX ORDER — PRAZOSIN HYDROCHLORIDE 1 MG/1
1 CAPSULE ORAL
COMMUNITY
Start: 2022-05-06 | End: 2022-07-11 | Stop reason: SDUPTHER

## 2022-07-11 NOTE — PROGRESS NOTES
"Subjective   Chief Complaint   Patient presents with   • Vaginal Bleeding     Follow after US     Alicia Rey is a 27 y.o. year old .  Patient's last menstrual period was 2022 (exact date).  She presents to be seen because of irregular bleeding with IUD. She has had near constant bleeding since 2021. She had Mirena IUD placed 2021. She was treated with estrogen and bleeding stopped however returned in May and has been present almost every day since May. Mostly bleeding when she wipes. Denies painful cramping. She would like sti screening today also as she has concerns for vaginal irritation.   She would also like a breast exam. She has no breast complaints however would like an exam and more information on self breast exams.   The following portions of the patient's history were reviewed and updated as appropriate:current medications and allergies    Social History    Tobacco Use      Smoking status: Former Smoker        Packs/day: 0.50        Years: 7.00        Pack years: 3.5        Types: Cigarettes        Start date:         Quit date: 2018        Years since quitting: 3.9      Smokeless tobacco: Never Used         Objective   /80 (BP Location: Left arm, Patient Position: Sitting, Cuff Size: Adult)   Ht 174 cm (68.5\")   Wt 113 kg (249 lb)   LMP 2022 (Exact Date) Comment: IUD  BMI 37.31 kg/m²    Breasts: breasts appear normal, no suspicious masses, no skin or nipple changes or axillary nodes.    Pelvic exam: normal external genitalia, vulva, vagina, cervix, uterus and adnexa, VULVA: normal appearing vulva with no masses, tenderness or lesions, VAGINA: normal appearing vagina with normal color and discharge, no lesions.one swab collected for sti screening    Lab Review   No data reviewed    Imaging   Pelvic ultrasound report        Assessment   1. Irregular menstrual bleeding with Mirena IUD  2. Vaginal irritation      Plan   1. IUD noted in correct placement. Discussed " potential causes of irregular bleeding with IUD. U/s tech mentioned possible polyp on images, will wait for Dr Borden to read ultrasound to see if further evaluation is needed, if no polyp visualized will treat with estrogen for endometrial lining stablization.  2. One swab sent for sti screening, patient declines blood work today  3. Normal breast exam. Educated patient on self breast exam and abnormal findings to report.   4. The importance of keeping all planned follow-up and taking all medications as prescribed was emphasized.  5. rtc for annual or prn  6. Will call patient after MD reviewes ultrasound to discuss plan of care.     No orders of the defined types were placed in this encounter.         This note was electronically signed.    Cande Henderson, CASSI  July 11, 2022

## 2022-07-12 DIAGNOSIS — N92.6 IRREGULAR MENSTRUAL BLEEDING: Primary | ICD-10-CM

## 2022-07-12 RX ORDER — TRANEXAMIC ACID 650 MG/1
1300 TABLET ORAL 3 TIMES DAILY PRN
Qty: 12 TABLET | Refills: 0 | Status: SHIPPED | OUTPATIENT
Start: 2022-07-12 | End: 2022-07-16

## 2022-07-14 ENCOUNTER — TELEPHONE (OUTPATIENT)
Dept: OBSTETRICS AND GYNECOLOGY | Facility: CLINIC | Age: 27
End: 2022-07-14

## 2022-07-14 NOTE — TELEPHONE ENCOUNTER
Provider: DAVID AGUIAR  Caller: INES CHAVARRIA  Relationship to Patient: SELF  Pharmacy: [facility] # [id number]  Phone Number: [phone]  Reason for Call: FOLLOW UP ON APPT.   When was the patient last seen: 7/11/22    PT CALLING TO CHECK IF DR. JACOB HAVE REVIEWED U/S AND TO DISCUSS PLAN OF CARE GOING FORWARD.

## 2022-07-15 ENCOUNTER — TELEPHONE (OUTPATIENT)
Dept: OBSTETRICS AND GYNECOLOGY | Facility: CLINIC | Age: 27
End: 2022-07-15

## 2022-07-15 RX ORDER — ESTRADIOL 1 MG/1
1 TABLET ORAL DAILY
Qty: 10 TABLET | Refills: 0 | Status: SHIPPED | OUTPATIENT
Start: 2022-07-15 | End: 2022-09-22

## 2022-07-15 NOTE — TELEPHONE ENCOUNTER
Done.    New Medications Ordered This Visit   Medications   • estradiol (Estrace) 1 MG tablet     Sig: Take 1 tablet by mouth Daily.     Dispense:  10 tablet     Refill:  0

## 2022-07-15 NOTE — TELEPHONE ENCOUNTER
You can let her know that the IUD appears to be appropriately positioned.  The best solution to fix this is going to be a birth control pill.  We can do that in addition to the IUD if she wishes.  She will need to do that for 2 or 3 months.  After stopping the birth control pill this may help.  Ultimately a lot of this may be related to her weight.  If she does not get her weight down.  When she stops her birth control pill is highly likely the bleeding will come back unpredictably

## 2022-07-15 NOTE — TELEPHONE ENCOUNTER
Provider: DAVID AGUIAR  Caller: INES CHAVARRIA  Relationship to Patient: SELF  Pharmacy: [facility] # [id number]  Phone Number: [phone]  Reason for Call: FOLLOW UP ON APPT.   When was the patient last seen: 7/11/22    PT CALLING TO CHECK IF DR. JACOB HAVE REVIEWED U/S AND TO DISCUSS PLAN OF CARE GOING FORWARD.

## 2022-07-15 NOTE — TELEPHONE ENCOUNTER
Spoke with pt and she stated that she is slowly losing weight and stated that she would like to have estrogen sent in to see if that will help with her bleeding.  She can't have progesterone because it messes with her mood.

## 2022-09-22 ENCOUNTER — OFFICE VISIT (OUTPATIENT)
Dept: OBSTETRICS AND GYNECOLOGY | Facility: CLINIC | Age: 27
End: 2022-09-22

## 2022-09-22 VITALS
HEIGHT: 68 IN | BODY MASS INDEX: 37.59 KG/M2 | DIASTOLIC BLOOD PRESSURE: 60 MMHG | WEIGHT: 248 LBS | SYSTOLIC BLOOD PRESSURE: 120 MMHG

## 2022-09-22 DIAGNOSIS — N90.7 LABIAL CYST: Primary | ICD-10-CM

## 2022-09-22 PROCEDURE — 99213 OFFICE O/P EST LOW 20 MIN: CPT | Performed by: NURSE PRACTITIONER

## 2022-09-22 RX ORDER — AMITRIPTYLINE HYDROCHLORIDE 25 MG/1
25 TABLET, FILM COATED ORAL
COMMUNITY
Start: 2022-06-22 | End: 2023-03-06 | Stop reason: SDUPTHER

## 2022-09-22 RX ORDER — CEPHALEXIN 500 MG/1
500 CAPSULE ORAL 4 TIMES DAILY
Qty: 40 CAPSULE | Refills: 0 | Status: SHIPPED | OUTPATIENT
Start: 2022-09-22 | End: 2022-10-02

## 2022-09-22 RX ORDER — FLUCONAZOLE 150 MG/1
150 TABLET ORAL ONCE
Qty: 1 TABLET | Refills: 3 | Status: SHIPPED | OUTPATIENT
Start: 2022-09-22 | End: 2022-09-22

## 2022-09-22 RX ORDER — BUTALBITAL, ACETAMINOPHEN AND CAFFEINE 50; 325; 40 MG/1; MG/1; MG/1
1 TABLET ORAL EVERY 6 HOURS PRN
COMMUNITY
Start: 2022-07-29

## 2022-09-22 NOTE — PROGRESS NOTES
"Subjective   Chief Complaint   Patient presents with   • Follow-up     Possible Cyst     Alicia Rey is a 27 y.o. year old .  Patient's last menstrual period was 2022 (exact date).  She presents to be seen because of possible labial cyst which she first noticed 22, she denies pain at area of cyst and one popped the day before.  She states she had multiple cysts in her vaginal area including 1 on her mons pubis.  She has had negative STI screening and has no concerns for STIs today.    The following portions of the patient's history were reviewed and updated as appropriate:current medications and allergies    Social History    Tobacco Use      Smoking status: Former Smoker        Packs/day: 0.50        Years: 7.00        Pack years: 3.5        Types: Cigarettes        Start date:         Quit date: 2018        Years since quittin.1      Smokeless tobacco: Never Used         Objective   /60 (BP Location: Right arm, Patient Position: Sitting, Cuff Size: Adult)   Ht 172.7 cm (68\")   Wt 112 kg (248 lb)   LMP 2022 (Exact Date)   Breastfeeding No   BMI 37.71 kg/m²   Pelvic exam: VULVA: vulvar lesion 3 small less than 0.5 mm cyst noted 1 on right labia majora 1 on left labial fold 1 on mons pubis., VAGINA: normal appearing vagina with normal color and discharge, no lesions.  Scars noted in groin area on upper thighs that suggest recurrent boils.    Lab Review   No data reviewed    Imaging   No data reviewed        Assessment   1. Labial cyst recurrent in nature suspicious for hidradenitis suppurativa     Plan   1. Start Keflex.  Prescription for Diflucan given as well as patient reports yeast infections with antibiotic use.  2. Encouraged use of warm compresses and  soaks  3. Will refer to dermatology for further evaluation of recurrent cyst suspected hidradenitis suppurativa.  The importance of keeping all planned follow-up and taking all medications as prescribed was " emphasized.  4. Return to care for annual exam.    No orders of the defined types were placed in this encounter.         This note was electronically signed.    Cande Henderson, CASSI  September 22, 2022

## 2023-01-26 RX ORDER — CEPHALEXIN 500 MG/1
500 CAPSULE ORAL 4 TIMES DAILY
Qty: 40 CAPSULE | Refills: 0 | Status: SHIPPED | OUTPATIENT
Start: 2023-01-26 | End: 2023-02-05

## 2023-02-06 ENCOUNTER — TELEPHONE (OUTPATIENT)
Dept: OBSTETRICS AND GYNECOLOGY | Facility: CLINIC | Age: 28
End: 2023-02-06
Payer: COMMERCIAL

## 2023-02-06 RX ORDER — FLUCONAZOLE 150 MG/1
150 TABLET ORAL DAILY
Qty: 1 TABLET | Refills: 1 | Status: SHIPPED | OUTPATIENT
Start: 2023-02-06 | End: 2023-03-06 | Stop reason: SDUPTHER

## 2023-03-06 ENCOUNTER — TELEPHONE (OUTPATIENT)
Dept: OBSTETRICS AND GYNECOLOGY | Facility: CLINIC | Age: 28
End: 2023-03-06
Payer: COMMERCIAL

## 2023-03-06 ENCOUNTER — OFFICE VISIT (OUTPATIENT)
Dept: OBSTETRICS AND GYNECOLOGY | Facility: CLINIC | Age: 28
End: 2023-03-06
Payer: COMMERCIAL

## 2023-03-06 VITALS
HEIGHT: 68 IN | DIASTOLIC BLOOD PRESSURE: 80 MMHG | BODY MASS INDEX: 42.74 KG/M2 | SYSTOLIC BLOOD PRESSURE: 130 MMHG | WEIGHT: 282 LBS

## 2023-03-06 DIAGNOSIS — L98.9 SKIN LESION: ICD-10-CM

## 2023-03-06 DIAGNOSIS — N91.2 AMENORRHEA: Primary | ICD-10-CM

## 2023-03-06 DIAGNOSIS — N89.8 VAGINAL IRRITATION: ICD-10-CM

## 2023-03-06 PROCEDURE — 99214 OFFICE O/P EST MOD 30 MIN: CPT | Performed by: NURSE PRACTITIONER

## 2023-03-06 RX ORDER — FLUCONAZOLE 150 MG/1
150 TABLET ORAL DAILY
Qty: 1 TABLET | Refills: 1 | Status: SHIPPED | OUTPATIENT
Start: 2023-03-06

## 2023-03-06 RX ORDER — CEPHALEXIN 500 MG/1
500 CAPSULE ORAL 4 TIMES DAILY
Qty: 40 CAPSULE | Refills: 0 | Status: SHIPPED | OUTPATIENT
Start: 2023-03-06 | End: 2023-03-16

## 2023-03-06 NOTE — TELEPHONE ENCOUNTER
sunil      Pt had positive pregnancy test over the weekend. She has iud. Some back  Pain. Breast tenderness.

## 2023-03-06 NOTE — TELEPHONE ENCOUNTER
Called and spoke with patient, schedueld her for appt with US and then with ST as per ST advisement.  She verified understanding and is aware of different offices for appts.

## 2023-03-07 ENCOUNTER — LAB (OUTPATIENT)
Dept: LAB | Facility: HOSPITAL | Age: 28
End: 2023-03-07
Payer: COMMERCIAL

## 2023-03-07 ENCOUNTER — PATIENT MESSAGE (OUTPATIENT)
Dept: OBSTETRICS AND GYNECOLOGY | Facility: CLINIC | Age: 28
End: 2023-03-07
Payer: COMMERCIAL

## 2023-03-07 DIAGNOSIS — N91.2 AMENORRHEA: ICD-10-CM

## 2023-03-07 LAB — HCG INTACT+B SERPL-ACNC: <0.1 MIU/ML

## 2023-03-07 PROCEDURE — 36415 COLL VENOUS BLD VENIPUNCTURE: CPT

## 2023-03-07 PROCEDURE — 84702 CHORIONIC GONADOTROPIN TEST: CPT

## 2023-03-07 NOTE — TELEPHONE ENCOUNTER
Patient  was contacted and she had thought that an UTI could cause a false positive pregnancy test. I did tell her it was a measurement of HCG and if she would like we could order a blood hcg

## 2023-03-07 NOTE — PROGRESS NOTES
"Subjective   Chief Complaint   Patient presents with   • Follow-up     IUD follow up after US     Alicia Rey is a 27 y.o. year old .  Patient's last menstrual period was 2023 (approximate).  She presents to be seen because of concerns for possible pregnancy.  She states 2 days ago she had nausea, breast tenderness mood changes and realize she had not started a cycle.  Her last period was .  She has an IUD in place, Mirena which was placed in our office in 2021.  She took a pregnancy test on Saturday at home which was positive and an hour later had a negative test at home as well.  She is also concerned for reddened skin area on her left breast and cyst on her lower abdomen.  She has used Keflex in the past and resolved her symptoms well.  She has not followed up with dermatology as referred at her last visit.  The following portions of the patient's history were reviewed and updated as appropriate:current medications and allergies    Social History    Tobacco Use      Smoking status: Former        Packs/day: 0.50        Years: 7.00        Pack years: 3.5        Types: Cigarettes        Start date:         Quit date: 2018        Years since quittin.6      Smokeless tobacco: Never         Objective   /80 (BP Location: Right arm, Patient Position: Sitting, Cuff Size: Adult)   Ht 172.7 cm (68\")   Wt 128 kg (282 lb)   LMP 2023 (Approximate)   BMI 42.88 kg/m²   Left breast with 5 mm area of reddened skin consistent with possible cyst or bug bite.  No drainage noted no erythema noted.  Area on midline lower abdomen with 3 inch area of erythema with central draining opening.  Lab Review   UPT negative in office today    Imaging   Pelvic ultrasound report   Transvaginal ultrasound in office today shows IUD in proper placement.     Assessment   1. Amenorrhea Mirena in place  2. Skin cyst on abdomen     Plan   1. Discussed ultrasound findings with patient and her " partner.  With negative UPT in office today likelihood of pregnancy is very low.  Order for quantitative hCG sent for patient reassurance  2. Start Keflex for skin irritation.  Diflucan sent as well as she frequently gets yeast infections with antibiotic use.  3. The importance of keeping all planned follow-up and taking all medications as prescribed was emphasized.  4. Will notify patient of lab results when available.  Follow-up interval to be determined by lab results.    New Medications Ordered This Visit   Medications   • fluconazole (Diflucan) 150 MG tablet     Sig: Take 1 tablet by mouth Daily.     Dispense:  1 tablet     Refill:  1   • cephalexin (Keflex) 500 MG capsule     Sig: Take 1 capsule by mouth 4 (Four) Times a Day for 10 days.     Dispense:  40 capsule     Refill:  0          This note was electronically signed.    Cande Henderson, CASSI  March 7, 2023

## 2023-03-20 ENCOUNTER — TELEMEDICINE (OUTPATIENT)
Dept: INTERNAL MEDICINE | Facility: CLINIC | Age: 28
End: 2023-03-20
Payer: COMMERCIAL

## 2023-03-20 DIAGNOSIS — J02.0 STREP THROAT: Primary | ICD-10-CM

## 2023-03-20 PROCEDURE — 99213 OFFICE O/P EST LOW 20 MIN: CPT | Performed by: PHYSICIAN ASSISTANT

## 2023-03-20 PROCEDURE — 1159F MED LIST DOCD IN RCRD: CPT | Performed by: PHYSICIAN ASSISTANT

## 2023-03-20 PROCEDURE — 1160F RVW MEDS BY RX/DR IN RCRD: CPT | Performed by: PHYSICIAN ASSISTANT

## 2023-03-20 RX ORDER — AZITHROMYCIN 250 MG/1
TABLET, FILM COATED ORAL
Qty: 6 TABLET | Refills: 0 | Status: SHIPPED | OUTPATIENT
Start: 2023-03-20

## 2023-03-20 NOTE — PROGRESS NOTES
"  Mode of Visit: Video  Location of patient: Home address  Location of provider: [facility] office at [address], [place] [address]  You have chosen to receive care through a telehealth visit.  Do you consent to use a audio/video connection for your medical care today? Yes  This was an audio and video enabled telemedicine encounter.  No technical issues occurred during this visit.    Chief Complaint  Sore Throat    Subjective          History of Present Illness  Alicia Rey presents to [facility] for   History of Present Illness  Pharyngitis:  She has a stomach ache that started this morning along with sore throat. No vomiting/diarrhea.   Her kids were dx with strep at  today. They looked at her throat also and said it was red and that she should call her PCP for abx.. No fevers. No rash, no cough/runny nose. She gets strep often and tonsillitis. Has been told she should get her tonsils out but is wanting to hold off at this time.      The following portions of the patient's history were reviewed and updated as appropriate: allergies, current medications, past family history, past medical history, past social history, past surgical history and problem list.    Allergies   Allergen Reactions   • Ciprofloxacin Hives     makes pt very sick  makes pt very sick  1makes pt very sick  makes pt very sick  makes pt very sick     • Naproxen Hives and Other (See Comments)   • Tomato Hives   • Lorazepam Anxiety   • Penicillin G Itching   • Valproic Acid Itching, Diarrhea and Other (See Comments)     Other reaction(s): GI Intolerance  Other reaction(s): Other     • Bactrim [Sulfamethoxazole-Trimethoprim] Nausea And Vomiting   • Bupropion Rash, Irritability, Anxiety and Other (See Comments)     \"anger outbursts\"  \"anger outbursts\"  \"anger outbursts\"  \"anger outbursts\"     • Depakote [Divalproex Sodium] GI Intolerance   • Gabapentin Rash, Irritability and Anxiety    "  Other reaction(s): Irritability     • Latex Rash       Current Outpatient Medications:   •  azithromycin (Zithromax Z-Gerhard) 250 MG tablet, Take 2 tablets by mouth on day 1, then 1 tablet daily on days 2-5, Disp: 6 tablet, Rfl: 0  •  butalbital-acetaminophen-caffeine (FIORICET, ESGIC) -40 MG per tablet, Take 1 tablet by mouth Every 6 (Six) Hours As Needed., Disp: , Rfl:   •  fluconazole (Diflucan) 150 MG tablet, Take 1 tablet by mouth Daily., Disp: 1 tablet, Rfl: 1  •  fluticasone (Flonase) 50 MCG/ACT nasal spray, 2 sprays into the nostril(s) as directed by provider Daily., Disp: 16 g, Rfl: 3  •  OXcarbazepine (TRILEPTAL) 150 MG tablet, 1 tablet 2 (Two) Times a Day., Disp: , Rfl:   •  prazosin (MINIPRESS) 1 MG capsule, Take 1 capsule by mouth Every Night., Disp: , Rfl:   New Medications Ordered This Visit   Medications   • azithromycin (Zithromax Z-Gerhard) 250 MG tablet     Sig: Take 2 tablets by mouth on day 1, then 1 tablet daily on days 2-5     Dispense:  6 tablet     Refill:  0     Social History     Tobacco Use   Smoking Status Former   • Packs/day: 0.50   • Years: 7.00   • Pack years: 3.50   • Types: Cigarettes   • Start date:    • Quit date: 2018   • Years since quittin.6   Smokeless Tobacco Never        Objective   There were no vitals filed for this visit.  There is no height or weight on file to calculate BMI.    Physical Exam   Constitutional: She appears well-developed and well-nourished. No distress.   HENT:   Head: Normocephalic and atraumatic.   Eyes: Conjunctivae and EOM are normal.   Neck: Neck normal appearance.  Pulmonary/Chest: Effort normal.  No respiratory distress. She no audible wheeze...  Neurological: She is alert.   Psychiatric: She has a normal mood and affect.   Vitals reviewed.      Result Review :        Assessment and Plan    Diagnoses and all orders for this visit:    1. Strep throat (Primary)  Assessment & Plan:  Supportive care, stay hydrated, rest.  Follow up if  symptoms worsen or persist. Monitor for new symptoms. Go to the ER for respiratory distress, dehydration, or severe symptoms.  zpack d/meg pcn allergy    Orders:  -     azithromycin (Zithromax Z-Gerhard) 250 MG tablet; Take 2 tablets by mouth on day 1, then 1 tablet daily on days 2-5  Dispense: 6 tablet; Refill: 0          Follow Up   Return if symptoms worsen or fail to improve.    Follow up if symptoms worsen or persist or has new or concerning symptoms, go to ER for severe symptoms.   I discussed my findings, recommendations, and plan of care with the patient. Reviewed common medication effects and side effects and to report side effects immediately. Encouraged medication compliance and the importance of keeping scheduled follow up appointments. Pt verbalized understanding and agreement.  If a referral was made please contact our office if you have not heard about an appointment in the next 2 weeks.   If labs or images are ordered we will contact you with the results within the next week.  If you have not heard from us after a week please call our office to inquire about the results.     I have reviewed the limitations of a telehealth visit (such as lack of a physical exam and unable to obtain vital signs) and advised the patient that they may need to follow up for an office visit should their symptoms or concerns persist, worsen, or change.    Mary Ann Rm PA-C    * Please note that portions of this note were completed with a voice recognition program.

## 2023-03-20 NOTE — ASSESSMENT & PLAN NOTE
Supportive care, stay hydrated, rest.  Follow up if symptoms worsen or persist. Monitor for new symptoms. Go to the ER for respiratory distress, dehydration, or severe symptoms.  zpack d/t pcn allergy

## 2023-03-29 ENCOUNTER — TELEPHONE (OUTPATIENT)
Dept: INTERNAL MEDICINE | Facility: CLINIC | Age: 28
End: 2023-03-29

## 2023-03-29 NOTE — TELEPHONE ENCOUNTER
"Caller: Alicia Rey \"Clarisse\"    Relationship: Self    Best call back number: [phone]    What is the medical concern/diagnosis: THREE MM SIZE LESIONS ON FRONTAL LOBE CORTEX    What specialty or service is being requested: NEURO    What is the provider, practice or medical service name: PREFERS [facility] IF THEY ARE IN NETWORK    Any additional details: SHE CURRENTLY IS A PATIENT WITH  NEURO BUT THEY HAVE A HARD TIME GETTING HER IN AND SHE HAS A HARD TIME WITH THE LOCATION OF THE OFFICE.    PLEASE HAVE DR IBARRA REFER TO ANOTHER NEUROLOGIST.  "

## 2023-04-17 ENCOUNTER — TELEPHONE (OUTPATIENT)
Dept: INTERNAL MEDICINE | Facility: CLINIC | Age: 28
End: 2023-04-17

## 2023-04-17 ENCOUNTER — HOSPITAL ENCOUNTER (OUTPATIENT)
Dept: GENERAL RADIOLOGY | Facility: HOSPITAL | Age: 28
Discharge: HOME | End: 2023-04-17
Admitting: FAMILY MEDICINE
Payer: COMMERCIAL

## 2023-04-17 ENCOUNTER — OFFICE VISIT (OUTPATIENT)
Dept: INTERNAL MEDICINE | Facility: CLINIC | Age: 28
End: 2023-04-17
Payer: COMMERCIAL

## 2023-04-17 VITALS
TEMPERATURE: 98.9 F | DIASTOLIC BLOOD PRESSURE: 86 MMHG | HEART RATE: 88 BPM | OXYGEN SATURATION: 98 % | WEIGHT: 291 LBS | HEIGHT: 68 IN | BODY MASS INDEX: 44.1 KG/M2 | SYSTOLIC BLOOD PRESSURE: 130 MMHG

## 2023-04-17 DIAGNOSIS — G43.009 MIGRAINE WITHOUT AURA AND WITHOUT STATUS MIGRAINOSUS, NOT INTRACTABLE: Primary | ICD-10-CM

## 2023-04-17 DIAGNOSIS — S92.505A CLOSED NONDISPLACED FRACTURE OF PHALANX OF LESSER TOE OF LEFT FOOT, UNSPECIFIED PHALANX, INITIAL ENCOUNTER: ICD-10-CM

## 2023-04-17 DIAGNOSIS — F17.210 CIGARETTE SMOKER MOTIVATED TO QUIT: ICD-10-CM

## 2023-04-17 DIAGNOSIS — S92.512A CLOSED DISPLACED FRACTURE OF PROXIMAL PHALANX OF LESSER TOE OF LEFT FOOT, INITIAL ENCOUNTER: ICD-10-CM

## 2023-04-17 DIAGNOSIS — S92.315A CLOSED NONDISPLACED FRACTURE OF FIRST METATARSAL BONE OF LEFT FOOT, INITIAL ENCOUNTER: Primary | ICD-10-CM

## 2023-04-17 DIAGNOSIS — S92.515A CLOSED NONDISPLACED FRACTURE OF PROXIMAL PHALANX OF LESSER TOE OF LEFT FOOT, INITIAL ENCOUNTER: ICD-10-CM

## 2023-04-17 PROCEDURE — 73630 X-RAY EXAM OF FOOT: CPT

## 2023-04-17 RX ORDER — NICOTINE 21 MG/24HR
1 PATCH, TRANSDERMAL 24 HOURS TRANSDERMAL EVERY 24 HOURS
Qty: 14 EACH | Refills: 4 | Status: SHIPPED | OUTPATIENT
Start: 2023-04-17

## 2023-04-17 NOTE — TELEPHONE ENCOUNTER
Orthopedic referral will be made.  Patient has a fracture of the first metatarsal.  Ortho referral will be made.
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
## 2023-04-19 NOTE — TELEPHONE ENCOUNTER
LMTC.  I let pt know I attempted to contact her 3 times by phone and unable to reach her.  I let her know that we will send her a [employer] message regarding this.

## 2023-04-27 NOTE — TELEPHONE ENCOUNTER
Dr. Chen,     Pt has not reviewed [employer] message and also looked like she missed her ortho appt. Please advise on next step.

## 2023-05-17 ENCOUNTER — OFFICE VISIT (OUTPATIENT)
Dept: ORTHOPEDIC SURGERY | Facility: CLINIC | Age: 28
End: 2023-05-17
Payer: COMMERCIAL

## 2023-05-17 VITALS
HEIGHT: 68 IN | SYSTOLIC BLOOD PRESSURE: 128 MMHG | WEIGHT: 292.8 LBS | BODY MASS INDEX: 44.38 KG/M2 | DIASTOLIC BLOOD PRESSURE: 88 MMHG

## 2023-05-17 DIAGNOSIS — S92.505D CLOSED NONDISPLACED FRACTURE OF PHALANX OF LESSER TOE OF LEFT FOOT WITH ROUTINE HEALING, UNSPECIFIED PHALANX, SUBSEQUENT ENCOUNTER: Primary | ICD-10-CM

## 2023-05-17 DIAGNOSIS — S92.315A NONDISPLACED FRACTURE OF FIRST METATARSAL BONE, LEFT FOOT, INITIAL ENCOUNTER FOR CLOSED FRACTURE: ICD-10-CM
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
## 2023-05-20 ENCOUNTER — TELEMEDICINE (OUTPATIENT)
Dept: FAMILY MEDICINE CLINIC | Facility: TELEHEALTH | Age: 28
End: 2023-05-20
Payer: COMMERCIAL

## 2023-05-20 VITALS — BODY MASS INDEX: 44.25 KG/M2 | WEIGHT: 292 LBS | HEIGHT: 68 IN

## 2023-05-20 DIAGNOSIS — J03.90 TONSILLITIS: Primary | ICD-10-CM

## 2023-05-20 RX ORDER — AZITHROMYCIN 250 MG/1
TABLET, FILM COATED ORAL
Qty: 6 TABLET | Refills: 0 | Status: SHIPPED | OUTPATIENT
Start: 2023-05-20
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
## 2023-05-20 NOTE — PATIENT INSTRUCTIONS
Tonsillitis  Tonsillitis is an infection of the throat that causes the tonsils to become red, tender, and swollen. Tonsils are tissues in the back of your throat. Each tonsil has crevices (crypts). Tonsils normally work to protect the body from infection.  What are the causes?  Sudden (acute) tonsillitis may be caused by a virus or bacteria, including streptococcal bacteria. Long-lasting (chronic) tonsillitis occurs when the crypts of the tonsils become filled with pieces of food and bacteria, which makes it easy for the tonsils to become repeatedly infected.  Tonsillitis can be spread from person to person when it is caused by a virus or bacteria. It may be spread by inhaling droplets that are released with coughing or sneezing. You may also come into contact with viruses or bacteria on surfaces, such as cups or utensils.  What are the signs or symptoms?  Symptoms of this condition include:  A sore throat. This may include trouble swallowing.  White patches on the tonsils.  Swollen tonsils.  Fever.  Headache.  Tiredness.  Loss of appetite.  Snoring during sleep when you did not snore before.  Small, foul-smelling, yellowish-white pieces of material (tonsilloliths) that you occasionally cough up or spit out. These can cause you to have bad breath.  How is this diagnosed?  This condition is diagnosed with a physical exam. Diagnosis can be confirmed with the results of lab tests, including a throat culture.  How is this treated?  Treatment for this condition depends on the cause, but usually focuses on treating the symptoms associated with it. Treatment may include:  Medicines to relieve pain and manage fever.  Steroid medicines to reduce swelling.  Antibiotic medicines if the condition is caused by bacteria.  If episodes of tonsillitis are severe and frequent, your health care provider may recommend surgery to remove the tonsils (tonsillectomy).  Follow these instructions at home:  Medicines  Take over-the-counter  and prescription medicines only as told by your health care provider.  If you were prescribed an antibiotic medicine, take it as told by your health care provider. Do not stop taking the antibiotic even if you start to feel better.  Eating and drinking  Drink enough fluid to keep your urine pale yellow.  While your throat is sore, eat soft or liquid foods, such as sherbet, soups, or soft, warm cereals, such as oatmeal or hot wheat cereal.  Drink warm liquids.  Eat frozen ice pops.  General instructions  Rest as much as possible and get plenty of sleep.  Gargle with a mixture of salt and water 3-4 times a day or as needed.  Do not swallow the mixture of salt and water.  Wash your hands regularly with soap and water for at least 20 seconds. If soap and water are not available, use hand .  Do not share cups, bottles, or other utensils until your symptoms have gone away.  Do not use any products that contain nicotine or tobacco. These products include cigarettes, chewing tobacco, and vaping devices, such as e-cigarettes. If you need help quitting, ask your health care provider.  Keep all follow-up visits. This is important.  Contact a health care provider if:  You notice large, tender lumps in your neck that were not there before.  You have a fever that does not go away after 2-3 days.  You develop a rash.  You cough up a green, yellow-brown, or bloody substance.  You cannot swallow liquids or food for 24 hours.  Only one of your tonsils is swollen.  Get help right away if:  You develop any new symptoms, such as vomiting, severe headache, stiff neck, chest pain, trouble breathing, or trouble swallowing.  You have severe throat pain along with drooling or voice changes.  You have severe pain that is not controlled with medicines.  You cannot fully open your mouth.  You develop redness, swelling, or severe pain anywhere in your neck.  Summary  Tonsillitis is an infection of the throat that causes the tonsils to  become red, tender, and swollen. The most common symptom is pain in the throat.  Tonsillitis is most often caused by a virus or bacteria.  Get help right away if you develop any new symptoms, such as vomiting, severe headache, stiff neck, chest pain, or trouble breathing.  This information is not intended to replace advice given to you by your health care provider. Make sure you discuss any questions you have with your health care provider.  Document Revised: 05/12/2022 Document Reviewed: 05/12/2022  [employer] Patient Education © 2022 [employer]

## 2023-06-12 ENCOUNTER — TELEPHONE (OUTPATIENT)
Dept: ORTHOPEDIC SURGERY | Facility: CLINIC | Age: 28
End: 2023-06-12
Payer: COMMERCIAL

## 2023-06-12 NOTE — TELEPHONE ENCOUNTER
Sent message to patient via [employer] to let her know they are working on getting approved and set up then they will call her.    Stephy ROSS) ROT

## 2023-06-12 NOTE — TELEPHONE ENCOUNTER
----- Message from Alicia Rey sent at 6/12/2023  7:46 AM EDT -----  Regarding: Mri left foot   Contact: [phone]  Can you please reput in a referral for a mri for my foot, the one that was put in is now closed.

## 2023-11-13 ENCOUNTER — OFFICE VISIT (OUTPATIENT)
Dept: INTERNAL MEDICINE | Facility: CLINIC | Age: 28
End: 2023-11-13
Payer: COMMERCIAL

## 2023-11-13 VITALS
HEIGHT: 69 IN | DIASTOLIC BLOOD PRESSURE: 70 MMHG | SYSTOLIC BLOOD PRESSURE: 130 MMHG | HEART RATE: 75 BPM | BODY MASS INDEX: 43.4 KG/M2 | OXYGEN SATURATION: 98 % | WEIGHT: 293 LBS | TEMPERATURE: 98 F

## 2023-11-13 DIAGNOSIS — H93.8X1 CONGESTION OF RIGHT EAR: ICD-10-CM

## 2023-11-13 DIAGNOSIS — N89.8 VAGINAL IRRITATION: ICD-10-CM

## 2023-11-13 DIAGNOSIS — R90.89 ABNORMAL CT OF BRAIN: ICD-10-CM

## 2023-11-13 DIAGNOSIS — L73.2 HIDRADENITIS SUPPURATIVA: ICD-10-CM

## 2023-11-13 DIAGNOSIS — R09.81 NASAL CONGESTION: ICD-10-CM

## 2023-11-13 DIAGNOSIS — R06.83 SNORES: ICD-10-CM

## 2023-11-13 DIAGNOSIS — E66.01 CLASS 3 SEVERE OBESITY WITH SERIOUS COMORBIDITY AND BODY MASS INDEX (BMI) OF 40.0 TO 44.9 IN ADULT, UNSPECIFIED OBESITY TYPE: ICD-10-CM

## 2023-11-13 DIAGNOSIS — R10.11 RIGHT UPPER QUADRANT ABDOMINAL PAIN: Primary | ICD-10-CM

## 2023-11-13 DIAGNOSIS — G47.9 SLEEP DISTURBANCE: ICD-10-CM

## 2023-11-13 DIAGNOSIS — R10.30 LOWER ABDOMINAL PAIN: ICD-10-CM

## 2023-11-13 LAB
BILIRUB BLD-MCNC: NEGATIVE MG/DL
CLARITY, POC: CLEAR
COLOR UR: YELLOW
EXPIRATION DATE: NORMAL
GLUCOSE UR STRIP-MCNC: NEGATIVE MG/DL
KETONES UR QL: NEGATIVE
LEUKOCYTE EST, POC: NEGATIVE
Lab: NORMAL
NITRITE UR-MCNC: NEGATIVE MG/ML
PH UR: 7.5 [PH] (ref 5–8)
PROT UR STRIP-MCNC: NEGATIVE MG/DL
RBC # UR STRIP: NEGATIVE /UL
SP GR UR: 1 (ref 1–1.03)
UROBILINOGEN UR QL: NORMAL

## 2023-11-13 RX ORDER — FLUCONAZOLE 150 MG/1
150 TABLET ORAL DAILY
Qty: 1 TABLET | Refills: 1 | Status: SHIPPED | OUTPATIENT
Start: 2023-11-13

## 2023-11-13 RX ORDER — PSEUDOEPHEDRINE HCL 30 MG
30 TABLET ORAL EVERY 4 HOURS PRN
COMMUNITY

## 2023-11-13 RX ORDER — IPRATROPIUM BROMIDE 21 UG/1
2 SPRAY, METERED NASAL EVERY 12 HOURS
Qty: 30 ML | Refills: 6 | Status: SHIPPED | OUTPATIENT
Start: 2023-11-13
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
## 2023-12-15 ENCOUNTER — HOSPITAL ENCOUNTER (OUTPATIENT)
Dept: NUTRITION | Facility: HOSPITAL | Age: 28
Setting detail: RECURRING SERIES
Discharge: HOME | End: 2023-12-15
Payer: COMMERCIAL

## 2023-12-15 PROCEDURE — 97802 MEDICAL NUTRITION INDIV IN: CPT

## 2023-12-15 NOTE — CONSULTS
[facility]          Initial 60 Minute Nutrition Visit    Date: 12/15/2023   Patient Name: Alicia Rey  : 1995   MRN: [id number]   Referring Provider: Deanna Chen MD    Reason for Visit: Weight management  Visit Format: Telehealth    Nutrition Assessment       Social History:   Social History     Socioeconomic History    Marital status: Single    Number of children: 3    Highest education level: Some college, no degree   Tobacco Use    Smoking status: Former     Packs/day: 0.50     Years: 7.00     Additional pack years: 0.00     Total pack years: 3.50     Types: Cigarettes     Start date:      Quit date: 2018     Years since quittin.3     Passive exposure: Current    Smokeless tobacco: Never   Vaping Use    Vaping Use: Every day    Substances: Nicotine    Devices: Disposable   Substance and Sexual Activity    Alcohol use: No    Drug use: No     Comment: Last used MDMA     Sexual activity: Yes     Partners: Male     Birth control/protection: I.U.D.     Comment: mirena     Active Problem List:   Patient Active Problem List    Diagnosis     Strep throat [J02.0]     Irregular menstrual bleeding [N92.6]     Mirena [Z97.5]     Annual GYN exam [Z01.419]     Bipolar affective disorder [F31.9]     Anxiety [F41.9]       Current Medications:   Current Outpatient Medications:     fluconazole (Diflucan) 150 MG tablet, Take 1 tablet by mouth Daily., Disp: 1 tablet, Rfl: 1    fluticasone (Flonase) 50 MCG/ACT nasal spray, 2 sprays into the nostril(s) as directed by provider Daily., Disp: 16 g, Rfl: 3    ipratropium (ATROVENT) 0.03 % nasal spray, 2 sprays into the nostril(s) as directed by provider Every 12 (Twelve) Hours., Disp: 30 mL, Rfl: 6    OXcarbazepine (TRILEPTAL) 150 MG tablet, 1 tablet 2 (Two) Times a Day., Disp: , Rfl:     prazosin (MINIPRESS) 1 MG capsule, Take 1 capsule by mouth Every Night., Disp: , Rfl:     pseudoephedrine (SUDAFED) 30 MG tablet,  "Take 1 tablet by mouth Every 4 (Four) Hours As Needed for Congestion., Disp: , Rfl:     Labs: No recent labs    Hunger Vital Sign Food Insecurity Assessment:  Within the past 12 months I/we worried whether our food would run out before I/we got money to buy more: No   Within the past 12 months the food I/we bought just didn't last and I/we didn't have money to get more: No   Use of food assistance programs (WIC, food stamps, food ac) No       Food & Nutrition Related History       Food Allergies: tomatoes  Food Intolerances: None  Food Behavior: emotional over/undereating  Nutrition Impact Symptoms: None  Gastrointestinal conditions that impact intake or food choices: gallbladder disease  Details at home: Lives with courtney, three kids (2 ND)  Who prepares most meals: Family  Who does grocery shopping: Family/patient  How many meals are purchased from fast food/sit down restaurants per week: Did not specify  Difficulty chewin - Normal  Difficulty swallowin - Normal  Diet requirement related to personal preference or cultural belief: None  History of eating disorder/disordered eating habits: Patient reports she \"wants to rip her skin off\" because she is uncomfortable in her body. She struggles with emotional over and undereating and is working through these issues with her therapist  Language/communication details: English  Barriers to learning: None    24 Hour Recall: See pre-appointment questionnaire for details  Additional comments: Patient tries to avoid processed food and makes healthier choices with whole grains. She drinks 1 gal water/day.     Anthropometrics      Height:   Ht Readings from Last 1 Encounters:   23 175.3 cm (69\")     Weight:   Wt Readings from Last 3 Encounters:   23 (!) 138 kg (304 lb)   23 (!) 138 kg (305 lb)   23 132 kg (290 lb)     BMI: There is no height or weight on file to calculate BMI.   Weight Change: Patient reports she started her weight loss at " 280#, lost to 220# using relaxed keto diet and exercise, maintained ~245#, then broke her foot and has gained to ~305#. She states she would feel happiest ~250#.      Physical Activity     Barriers to physical activity: Limited time for physical activity    Physical activity comments: Patient walks 2-3 miles/day, loves to swim and hike     Estimated Needs     Estimated Energy Needs: [id number] (14 kcal/kg CBW)    Estimated Protein Needs: 20-25% daily calories     Estimated Fluid Needs: At least 64 oz water/day     Discussion / Education      Patient was referred for weight management. Her primary motivation is to improve confidence, reduce risk of chronic disease/long term complications, support family, improve energy levels, and improve symptoms. Her primary barriers to change are portion control, budgeting, time management, emotional eating, and overcoming cravings. She lives with family, with partner/spouse, and with children. Cooking and grocery shopping are done by patient and partner/spouse. She has not previously seen a dietitian/nutritionist. Patient lives with family, including 3 young children, 2 of which are neurodivergent and have sensory issues, further impacting food choices for the family. Patient has some negative thoughts about her weight and eating habits and is working with her therapist to address unhealthy thoughts and behaviors. She reports her thyroid is normal and she is physically active while making healthy food choices and is frustrated with her weight gain and lack of progress to date. She also is concerned about her gallbladder and reports she wants to avoid removing it because of the recovery period afterwards, which she says will not be possible due to her children's needs.     ANASTACIO provided education about the three macronutrients and the roles of each for promoting good health and balance. Patient was able to identify several foods in each category that they consume regularly. ANASTACIO  "emphasized the importance of incorporating a variety of sources for each, as well as increasing fiber rich choices such as whole grains and vegetables/fruits to improve blood sugar, cholesterol, and satiety. RD reviewed the different types of dietary fat and the roles of each in managing cholesterol and reducing risks of heart disease and other complications. RD provided a visual of a \"balanced meal\" with adequate portions of carbohydrates, protein, and fruits/vegetables. Patient compared the visual to their own meals and was able to identify some areas for improved balance and portion sizes. RD also discussed the importance of eating a protein and fiber rich breakfast to improve energy levels and blood glucose throughout the rest of the day.    RD and patient worked to set several goals for patient. RD provided contact info and encouraged patient to reach out with any additional questions or concerns following the appointment.    Assessment of patient engagement: Engaged    Measurement of understanding: Patient verbalized understanding, Patient able to demonstrate understanding with teach back     Resources Provided:   Weight management packet    Goal (s)      Goal 1: Limit red tamar to 1x/week     Goal 2: Keep a food log to improve awareness of food choices and send to RD     Goal 3: Include CHO with protein at snacks     Plan of Care     PES Statement:   Overweight / Obesity related to diet and lifestye as evidenced by BMI.     Follow Up Visit      Follow Up Scheduled for 1/19 @ 2:15 pm.    Total of 60 minutes spent with patient on nutrition counseling. Education based on Academy of Nutrition and Dietetics guidelines. Patient was provided with RD's contact information. Thank you for this referral.      "

## 2024-01-04 ENCOUNTER — OFFICE VISIT (OUTPATIENT)
Dept: INTERNAL MEDICINE | Facility: CLINIC | Age: 29
End: 2024-01-04
Payer: COMMERCIAL

## 2024-01-04 VITALS
WEIGHT: 293 LBS | SYSTOLIC BLOOD PRESSURE: 136 MMHG | BODY MASS INDEX: 43.4 KG/M2 | TEMPERATURE: 98.2 F | DIASTOLIC BLOOD PRESSURE: 84 MMHG | OXYGEN SATURATION: 97 % | HEIGHT: 69 IN | HEART RATE: 85 BPM

## 2024-01-04 DIAGNOSIS — R82.90 ABNORMAL URINALYSIS: ICD-10-CM

## 2024-01-04 DIAGNOSIS — R10.11 RIGHT UPPER QUADRANT PAIN: ICD-10-CM

## 2024-01-04 DIAGNOSIS — R35.0 FREQUENCY OF URINATION: Primary | ICD-10-CM

## 2024-01-04 LAB
BILIRUB BLD-MCNC: NEGATIVE MG/DL
CLARITY, POC: CLEAR
COLOR UR: YELLOW
EXPIRATION DATE: ABNORMAL
GLUCOSE UR STRIP-MCNC: NEGATIVE MG/DL
KETONES UR QL: NEGATIVE
LEUKOCYTE EST, POC: NEGATIVE
Lab: ABNORMAL
NITRITE UR-MCNC: NEGATIVE MG/ML
PH UR: 6 [PH] (ref 5–8)
PROT UR STRIP-MCNC: NEGATIVE MG/DL
RBC # UR STRIP: ABNORMAL /UL
SP GR UR: 1.02 (ref 1–1.03)
UROBILINOGEN UR QL: NORMAL

## 2024-01-04 PROCEDURE — 87086 URINE CULTURE/COLONY COUNT: CPT | Performed by: FAMILY MEDICINE

## 2024-01-04 RX ORDER — NITROFURANTOIN 25; 75 MG/1; MG/1
100 CAPSULE ORAL 2 TIMES DAILY
Qty: 20 CAPSULE | Refills: 0 | Status: SHIPPED | OUTPATIENT
Start: 2024-01-04
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
## 2024-01-05 LAB — BACTERIA SPEC AEROBE CULT: NO GROWTH

## 2024-01-07 DIAGNOSIS — R31.29 OTHER MICROSCOPIC HEMATURIA: Primary | ICD-10-CM

## 2024-01-09 ENCOUNTER — TELEPHONE (OUTPATIENT)
Dept: INTERNAL MEDICINE | Facility: CLINIC | Age: 29
End: 2024-01-09
Payer: COMMERCIAL

## 2024-01-09 ENCOUNTER — CLINICAL SUPPORT (OUTPATIENT)
Dept: INTERNAL MEDICINE | Facility: CLINIC | Age: 29
End: 2024-01-09
Payer: COMMERCIAL

## 2024-01-09 DIAGNOSIS — R10.9 FLANK PAIN: Primary | ICD-10-CM

## 2024-01-09 LAB
BILIRUB BLD-MCNC: NEGATIVE MG/DL
CLARITY, POC: CLEAR
COLOR UR: NORMAL
EXPIRATION DATE: NORMAL
GLUCOSE UR STRIP-MCNC: NEGATIVE MG/DL
KETONES UR QL: NEGATIVE
LEUKOCYTE EST, POC: NEGATIVE
Lab: NORMAL
NITRITE UR-MCNC: NEGATIVE MG/ML
PH UR: 6 [PH] (ref 5–8)
PROT UR STRIP-MCNC: NEGATIVE MG/DL
RBC # UR STRIP: NEGATIVE /UL
SP GR UR: 1.02 (ref 1–1.03)
UROBILINOGEN UR QL: NORMAL

## 2024-01-09 PROCEDURE — 81003 URINALYSIS AUTO W/O SCOPE: CPT | Performed by: FAMILY MEDICINE

## 2024-01-09 NOTE — TELEPHONE ENCOUNTER
Patient stopped by to do a repeat UA and stated her flank pain has not gotten any better. Would like to know if the blood in the urine is the cause of that and if there is another medication that needs to be called in to try

## 2024-01-10 NOTE — TELEPHONE ENCOUNTER
Repeat urinalysis was normal. Might need further studies.  There is a pending ultrasound for the gallbladder that generally shows the right kidney

## 2024-01-19 ENCOUNTER — HOSPITAL ENCOUNTER (OUTPATIENT)
Dept: NUTRITION | Facility: HOSPITAL | Age: 29
Setting detail: RECURRING SERIES
Discharge: HOME | End: 2024-01-19
Payer: COMMERCIAL

## 2024-02-09 ENCOUNTER — HOSPITAL ENCOUNTER (OUTPATIENT)
Dept: ULTRASOUND IMAGING | Facility: HOSPITAL | Age: 29
Discharge: HOME | End: 2024-02-09
Payer: COMMERCIAL

## 2024-02-09 DIAGNOSIS — R10.11 RIGHT UPPER QUADRANT PAIN: ICD-10-CM

## 2024-02-09 PROCEDURE — 76705 ECHO EXAM OF ABDOMEN: CPT

## 2024-02-23 ENCOUNTER — HOSPITAL ENCOUNTER (OUTPATIENT)
Dept: NUTRITION | Facility: HOSPITAL | Age: 29
Setting detail: RECURRING SERIES
Discharge: HOME | End: 2024-02-23
Payer: COMMERCIAL

## 2024-02-23 PROCEDURE — 97803 MED NUTRITION INDIV SUBSEQ: CPT

## 2024-02-23 NOTE — CONSULTS
[facility]          30 Minute Continued Nutrition Follow Up     Date: 2024   Patient Name: Alicia Rey  : 1995   MRN: [id number]   Referring Provider: Deanna Chen MD    Reason for Visit: Weight management  Visit Format: Telehealth    Discussion / Education      Patient was seen today for continued follow up related to weight management. Patient has no new concerns for chewing or swallowing difficulties. Patient has no new concerns for gastrointestinal problems. Patient has no new concerns for food insecurity. Topics discussed included mental health issues, new medication, and impact of stressors on daily life.    Previous goals included:    Goal 1: Eat at least 2 meals per day.  Goal 2: Include a protein food with each meal.   Goal 3: Discuss cooking 1 healthy meal per week with primary cook in the household.    Patient has made good progress on her goals.    1. Patient reports she is eating at least 2 meals most day, but occasionally has 1 meal and a snack instead.  2. Patient has included a protein food with most meals.  3. Patient discussed cooking schedule with household but they were unwilling to negotiate.    Patient does desire continued follow up at this time. Patient requested that RD call in a month to schedule appointment. RD provided contact info and encouraged patient to reach out as needed for support.    Assessment of patient engagement: Engaged    Measurement of understanding: Patient verbalized understanding, Patient able to demonstrate understanding with teach back     Resources Provided:  No new resources provided     Goal (s)      Goal 1: Eat at least 2 meals per day.    Follow Up Visit      Follow Up not scheduled at this time.    Total of 30 minutes spent with patient on nutrition counseling. Education based on Academy of Nutrition and Dietetics guidelines. Patient was provided with RD's contact information. Thank you for this  referral.

## 2024-04-04 ENCOUNTER — TELEPHONE (OUTPATIENT)
Dept: NUTRITION | Facility: HOSPITAL | Age: 29
End: 2024-04-04
Payer: COMMERCIAL

## 2024-04-04 NOTE — PROGRESS NOTES
Called patient to check in per patient request at last follow up. Patient expressed frustration with her living situation and struggles with food choices. RD encouraged her to continue the healthy choices she has already been making and continue to work towards a healthier living situation. Patient asked about several specific foods and RD reviewed label briefly with her. Patient requested check in call in 3 months to possibly schedule additional follow up.

## 2024-06-28 ENCOUNTER — OFFICE VISIT (OUTPATIENT)
Dept: INTERNAL MEDICINE | Facility: CLINIC | Age: 29
End: 2024-06-28

## 2024-06-28 VITALS
WEIGHT: 293 LBS | OXYGEN SATURATION: 97 % | HEIGHT: 69 IN | HEART RATE: 88 BPM | BODY MASS INDEX: 43.4 KG/M2 | DIASTOLIC BLOOD PRESSURE: 92 MMHG | TEMPERATURE: 97.8 F | SYSTOLIC BLOOD PRESSURE: 140 MMHG

## 2024-06-28 DIAGNOSIS — R03.0 ELEVATED BLOOD PRESSURE READING: ICD-10-CM

## 2024-06-28 DIAGNOSIS — E16.2 LOW BLOOD SUGAR READING: Primary | ICD-10-CM

## 2024-06-28 LAB
EXPIRATION DATE: NORMAL
EXPIRATION DATE: NORMAL
GLUCOSE BLDC GLUCOMTR-MCNC: 85 MG/DL (ref 70–130)
HBA1C MFR BLD: 5.4 % (ref 4.5–5.7)
Lab: NORMAL
Lab: NORMAL
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
## 2024-07-03 ENCOUNTER — TELEPHONE (OUTPATIENT)
Dept: NUTRITION | Facility: HOSPITAL | Age: 29
End: 2024-07-03

## 2024-07-03 NOTE — PROGRESS NOTES
Patient emailed RD to ask for guidance regarding recent hypoglycemia episodes. RD called patient and discussed current concerns. Patient has had some issues with health insurance this week and has not had new labs done yet but plans to complete them in the next few days. RD also discussed state benefits with patient, who says that she is waiting for paperwork before she can enroll in nutrition benefits for herself and her family. RD encouraged patient to reach out to her local representative to help direct process and enroll in benefits. RD provided [facility] representative contact info. RD also encouraged patient to send a 1 week food log for review at next FU appt. Patient reports high levels of physical and emotional stress at present and RD reminded her that stress can contribute to weight gain and blood sugar issues. Patient was reassured following phone call. Patient will reach out as needed.

## 2024-07-08 ENCOUNTER — TELEMEDICINE (OUTPATIENT)
Dept: FAMILY MEDICINE CLINIC | Facility: TELEHEALTH | Age: 29
End: 2024-07-08

## 2024-07-08 DIAGNOSIS — N39.0 URINARY TRACT INFECTION WITHOUT HEMATURIA, SITE UNSPECIFIED: Primary | ICD-10-CM

## 2024-07-08 PROCEDURE — 99213 OFFICE O/P EST LOW 20 MIN: CPT | Performed by: NURSE PRACTITIONER

## 2024-07-08 RX ORDER — PHENAZOPYRIDINE HYDROCHLORIDE 200 MG/1
200 TABLET, FILM COATED ORAL 3 TIMES DAILY PRN
Qty: 6 TABLET | Refills: 0 | Status: SHIPPED | OUTPATIENT
Start: 2024-07-08 | End: 2024-07-10

## 2024-07-08 RX ORDER — NITROFURANTOIN 25; 75 MG/1; MG/1
100 CAPSULE ORAL 2 TIMES DAILY
Qty: 14 CAPSULE | Refills: 0 | Status: SHIPPED | OUTPATIENT
Start: 2024-07-08 | End: 2024-07-15
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
## 2024-08-19 ENCOUNTER — LAB (OUTPATIENT)
Dept: INTERNAL MEDICINE | Facility: CLINIC | Age: 29
End: 2024-08-19
Payer: COMMERCIAL

## 2024-08-19 ENCOUNTER — OFFICE VISIT (OUTPATIENT)
Dept: INTERNAL MEDICINE | Facility: CLINIC | Age: 29
End: 2024-08-19
Payer: COMMERCIAL

## 2024-08-19 VITALS
HEART RATE: 94 BPM | OXYGEN SATURATION: 97 % | TEMPERATURE: 97.8 F | DIASTOLIC BLOOD PRESSURE: 84 MMHG | HEIGHT: 69 IN | SYSTOLIC BLOOD PRESSURE: 136 MMHG | WEIGHT: 293 LBS | BODY MASS INDEX: 43.4 KG/M2

## 2024-08-19 DIAGNOSIS — N64.52 BREAST DISCHARGE: ICD-10-CM

## 2024-08-19 DIAGNOSIS — N63.0 BREAST SWELLING: ICD-10-CM

## 2024-08-19 DIAGNOSIS — I10 PRIMARY HYPERTENSION: Primary | ICD-10-CM

## 2024-08-19 DIAGNOSIS — R90.89 ABNORMAL CT OF BRAIN: ICD-10-CM

## 2024-08-19 DIAGNOSIS — N91.2 AMENORRHEA: ICD-10-CM

## 2024-08-19 DIAGNOSIS — N64.4 BREAST TENDERNESS: ICD-10-CM

## 2024-08-19 LAB
B-HCG UR QL: NEGATIVE
EXPIRATION DATE: NORMAL
INTERNAL NEGATIVE CONTROL: NORMAL
INTERNAL POSITIVE CONTROL: NORMAL
Lab: NORMAL

## 2024-08-19 PROCEDURE — 1159F MED LIST DOCD IN RCRD: CPT | Performed by: FAMILY MEDICINE

## 2024-08-19 PROCEDURE — 99214 OFFICE O/P EST MOD 30 MIN: CPT | Performed by: FAMILY MEDICINE

## 2024-08-19 PROCEDURE — 1126F AMNT PAIN NOTED NONE PRSNT: CPT | Performed by: FAMILY MEDICINE

## 2024-08-19 PROCEDURE — 1160F RVW MEDS BY RX/DR IN RCRD: CPT | Performed by: FAMILY MEDICINE

## 2024-08-19 PROCEDURE — 81025 URINE PREGNANCY TEST: CPT | Performed by: FAMILY MEDICINE

## 2024-08-19 RX ORDER — PRAZOSIN HYDROCHLORIDE 2 MG/1
2 CAPSULE ORAL NIGHTLY
Qty: 90 CAPSULE | Refills: 1 | Status: SHIPPED | OUTPATIENT
Start: 2024-08-19
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
## 2024-08-21 ENCOUNTER — LAB (OUTPATIENT)
Dept: LAB | Facility: HOSPITAL | Age: 29
End: 2024-08-21
Payer: COMMERCIAL

## 2024-08-21 DIAGNOSIS — E16.2 LOW BLOOD SUGAR READING: ICD-10-CM

## 2024-08-21 DIAGNOSIS — N63.0 BREAST SWELLING: ICD-10-CM

## 2024-08-21 DIAGNOSIS — I10 PRIMARY HYPERTENSION: ICD-10-CM

## 2024-08-21 DIAGNOSIS — N64.52 BREAST DISCHARGE: ICD-10-CM

## 2024-08-21 DIAGNOSIS — R03.0 ELEVATED BLOOD PRESSURE READING: ICD-10-CM

## 2024-08-21 LAB
ALBUMIN SERPL-MCNC: 4.3 G/DL (ref 3.5–5.2)
ALBUMIN/GLOB SERPL: 1.3 G/DL
ALP SERPL-CCNC: 125 U/L (ref 39–117)
ALT SERPL W P-5'-P-CCNC: 21 U/L (ref 1–33)
ANION GAP SERPL CALCULATED.3IONS-SCNC: 9.7 MMOL/L (ref 5–15)
AST SERPL-CCNC: 15 U/L (ref 1–32)
BILIRUB SERPL-MCNC: 0.4 MG/DL (ref 0–1.2)
BUN SERPL-MCNC: 10 MG/DL (ref 6–20)
BUN/CREAT SERPL: 12.7 (ref 7–25)
CALCIUM SPEC-SCNC: 9.6 MG/DL (ref 8.6–10.5)
CHLORIDE SERPL-SCNC: 102 MMOL/L (ref 98–107)
CHOLEST SERPL-MCNC: 201 MG/DL (ref 0–200)
CO2 SERPL-SCNC: 27.3 MMOL/L (ref 22–29)
CORTIS AM PEAK SERPL-MCNC: 5.29 MCG/DL
CREAT SERPL-MCNC: 0.79 MG/DL (ref 0.57–1)
EGFRCR SERPLBLD CKD-EPI 2021: 104 ML/MIN/1.73
GLOBULIN UR ELPH-MCNC: 3.2 GM/DL
GLUCOSE SERPL-MCNC: 75 MG/DL (ref 65–99)
HDLC SERPL-MCNC: 42 MG/DL (ref 40–60)
LDLC SERPL CALC-MCNC: 132 MG/DL (ref 0–100)
LDLC/HDLC SERPL: 3.06 {RATIO}
POTASSIUM SERPL-SCNC: 4.2 MMOL/L (ref 3.5–5.2)
PROLACTIN SERPL-MCNC: 7.29 NG/ML (ref 4.79–23.3)
PROT SERPL-MCNC: 7.5 G/DL (ref 6–8.5)
SODIUM SERPL-SCNC: 139 MMOL/L (ref 136–145)
TRIGL SERPL-MCNC: 152 MG/DL (ref 0–150)
TSH SERPL DL<=0.05 MIU/L-ACNC: 1.77 UIU/ML (ref 0.27–4.2)
VLDLC SERPL-MCNC: 27 MG/DL (ref 5–40)

## 2024-08-21 PROCEDURE — 84146 ASSAY OF PROLACTIN: CPT

## 2024-08-21 PROCEDURE — 82533 TOTAL CORTISOL: CPT

## 2024-08-21 PROCEDURE — 83525 ASSAY OF INSULIN: CPT

## 2024-08-21 PROCEDURE — 80050 GENERAL HEALTH PANEL: CPT

## 2024-08-21 PROCEDURE — 80061 LIPID PANEL: CPT

## 2024-08-21 PROCEDURE — 82024 ASSAY OF ACTH: CPT

## 2024-08-22 LAB
BASOPHILS # BLD AUTO: 0.01 10*3/MM3 (ref 0–0.2)
BASOPHILS NFR BLD AUTO: 0.1 % (ref 0–1.5)
DEPRECATED RDW RBC AUTO: 37.1 FL (ref 37–54)
EOSINOPHIL # BLD AUTO: 0.1 10*3/MM3 (ref 0–0.4)
EOSINOPHIL NFR BLD AUTO: 1.5 % (ref 0.3–6.2)
ERYTHROCYTE [DISTWIDTH] IN BLOOD BY AUTOMATED COUNT: 12.6 % (ref 12.3–15.4)
HCT VFR BLD AUTO: 41.1 % (ref 34–46.6)
HGB BLD-MCNC: 13.6 G/DL (ref 12–15.9)
IMM GRANULOCYTES # BLD AUTO: 0.03 10*3/MM3 (ref 0–0.05)
IMM GRANULOCYTES NFR BLD AUTO: 0.4 % (ref 0–0.5)
LYMPHOCYTES # BLD AUTO: 1.82 10*3/MM3 (ref 0.7–3.1)
LYMPHOCYTES NFR BLD AUTO: 26.5 % (ref 19.6–45.3)
MCH RBC QN AUTO: 27.6 PG (ref 26.6–33)
MCHC RBC AUTO-ENTMCNC: 33.1 G/DL (ref 31.5–35.7)
MCV RBC AUTO: 83.5 FL (ref 79–97)
MONOCYTES # BLD AUTO: 0.56 10*3/MM3 (ref 0.1–0.9)
MONOCYTES NFR BLD AUTO: 8.2 % (ref 5–12)
NEUTROPHILS NFR BLD AUTO: 4.35 10*3/MM3 (ref 1.7–7)
NEUTROPHILS NFR BLD AUTO: 63.3 % (ref 42.7–76)
NRBC BLD AUTO-RTO: 0 /100 WBC (ref 0–0.2)
PLATELET # BLD AUTO: 341 10*3/MM3 (ref 140–450)
PMV BLD AUTO: 9.5 FL (ref 6–12)
RBC # BLD AUTO: 4.92 10*6/MM3 (ref 3.77–5.28)
WBC NRBC COR # BLD AUTO: 6.87 10*3/MM3 (ref 3.4–10.8)

## 2024-08-23 LAB — ACTH PLAS-MCNC: 17.3 PG/ML (ref 7.2–63.3)

## 2024-08-29 LAB — INSULIN SERPL-ACNC: 37 UIU/ML

## 2024-09-04 ENCOUNTER — TELEPHONE (OUTPATIENT)
Dept: NUTRITION | Facility: HOSPITAL | Age: 29
End: 2024-09-04
Payer: COMMERCIAL

## 2024-09-04 NOTE — PROGRESS NOTES
Patient requested call via email, spoke to patient and scheduled additional FU appt. She has concerns over recent labwork. She also asked for contact info for Radiology as she is expecting a referral call from them but has not received it.

## 2024-09-11 ENCOUNTER — HOSPITAL ENCOUNTER (OUTPATIENT)
Dept: NUTRITION | Facility: HOSPITAL | Age: 29
Setting detail: RECURRING SERIES
Discharge: HOME | End: 2024-09-11
Payer: COMMERCIAL

## 2024-09-11 PROCEDURE — 97803 MED NUTRITION INDIV SUBSEQ: CPT

## 2024-09-11 NOTE — PROGRESS NOTES
[facility] Nutrition Services          30 Minute Continued Nutrition Follow Up     Date: 2024   Patient Name: Alicia Rey  : 1995   MRN: [id number]   Referring Provider: Deanna Chen MD    Reason for Visit: Weight management  Visit Format: Telehealth    Discussion / Education      Patient was seen today for continued follow up related to weight management. Patient has no new concerns for chewing or swallowing difficulties. Patient has no new concerns for gastrointestinal problems. Patient has been unable to obtain SNAP benefits due to not having the paperwork for her kids that she needs, but recently got a new job at [employer] and plans to use her employee discount and share her mother's SNAP benefits to supplement her food budget. Patient was concerned about recent labwork, specifically her Lipid Panel and Insulin level. Her Chol, TG were borderline but not concerning to RD, but her LDL was elevated and her Insulin labs showed insulin resistance. Patient has family history on both sides of T1DM and T2DM, as well as high cholesterol. RD reviewed guidelines for insulin resistance, including focusing on nutrient density and low GI foods. Patient has been planning meals more consistently and including more complex CHO. She is concerned about hypoglycemia episodes at work and RD discussed plan for combating hypoglycemia and preventing future events.    Previous goals included:    Goal 1: Eat at least 2 meals per day.    Patient has made excellent progress on her goals.    1. Patient is eating consistently 3 meals per day.    Patient does not desire continued follow up at this time. RD provided contact info and encouraged patient to reach out as needed for support.    Assessment of patient engagement: Engaged    Measurement of understanding: Patient verbalized understanding, Patient able to demonstrate understanding with teach back     Resources Provided:  Frozen  Favorites    Goal (s)      Goal 1: Include a complex CHO with each meal.   Goal 2: Pair protein and CHO at meals and snacks.     Follow Up Visit      Follow Up not scheduled at this time     Total of 30 minutes spent with patient on nutrition counseling. Education based on Academy of Nutrition and Dietetics guidelines. Patient was provided with RD's contact information. Thank you for this referral.

## 2024-11-06 ENCOUNTER — TELEPHONE (OUTPATIENT)
Dept: NUTRITION | Facility: HOSPITAL | Age: 29
End: 2024-11-06
Payer: COMMERCIAL

## 2024-11-06 ENCOUNTER — TELEPHONE (OUTPATIENT)
Dept: OBSTETRICS AND GYNECOLOGY | Facility: CLINIC | Age: 29
End: 2024-11-06
Payer: COMMERCIAL

## 2024-11-06 NOTE — TELEPHONE ENCOUNTER
Provider: DR. JACOB    Caller: MALCOM CHAVARRIA    Relationship to Patient: SELF    Pharmacy: [facility] @ [place], [place]    Phone Number: [phone]    Reason for Call: PT WANTS TO KNOW COSTS OF REPLACING HER MIRENA THAT SHE HAD INSERTED 1-4-21 THROUGH INSURANCE. PER OFFICE THEY WILL GET W/THE FINIANCIAL COUNSELOR ON THIS AND ALSO SHE BETSY 1ST AVAIL GYN F/U FOR TUBAL CONSULT.    When was the patient last seen: 03-06-23

## 2024-11-06 NOTE — TELEPHONE ENCOUNTER
Patient states that she wants her Mirena replaced earlier due to her concerns that she may lose her reproductive rights. Therefore she wants to see if she can have insurance approve before it is due. She was scheduled for a surgery consultation with Dr. Borden on 1/2/2025 and an annual with Cande Henderson on 11/13/2024. She reports that she has had several miscarriages and if she cannot have it replaced early then she would prefer a tubal to avoid any possible legal complications.

## 2024-11-06 NOTE — TELEPHONE ENCOUNTER
Though I cannot guarantee reproductive rights will be changed with the new administration I think that is an exceptionally unlikely premise.  Her IUD is not due to be replaced until the end of 2028.  If she is doing well with that it would not be my advice that we do anything but if she is committed to permanent sterilization and wants to have a tubal, we can do that

## 2024-11-06 NOTE — PROGRESS NOTES
Called patient to check in, she does not need follow up at this time and has continued to work on her diet. RD encouraged her to reach out if she needs further support.

## 2024-11-07 NOTE — TELEPHONE ENCOUNTER
Sounds good.  If she is looking for a surgical consultation, she will not be able to get that with Cande.  If she is looking for a surgical consultation for tubal ligation she will need to schedule that with me.  Knowing that she has got multiple years before the IUD is set to , if she wants to do the annual with Cande and then see me subsequently for sterilization consultation, we can do that.

## 2024-11-07 NOTE — TELEPHONE ENCOUNTER
She is going to come in for for a surgical consult in January and come in for an annual next with CASSI Albright

## 2024-11-08 ENCOUNTER — TELEPHONE (OUTPATIENT)
Dept: OBSTETRICS AND GYNECOLOGY | Facility: CLINIC | Age: 29
End: 2024-11-08
Payer: COMMERCIAL

## 2024-11-08 NOTE — TELEPHONE ENCOUNTER
Left message for pt to return call.   Please advise pt that we cannot reinsert her Mirena on the day of her annual. We have the option to remove and wait to reinsert or we can do removal and reinsertion in the same day. Please schedule accordingly

## 2024-11-08 NOTE — TELEPHONE ENCOUNTER
Yes, this has been discussed with the patient and she demonstrates understanding and appreciation

## 2024-11-11 NOTE — TELEPHONE ENCOUNTER
Pt informed and stated understanding.   She has been advised that she is additionally several years early in reinsertion per Dr. Borden and demonstrates understanding and has an upcoming appt to talk about possible sterilization.

## 2024-11-18 ENCOUNTER — OFFICE VISIT (OUTPATIENT)
Dept: INTERNAL MEDICINE | Facility: CLINIC | Age: 29
End: 2024-11-18
Payer: COMMERCIAL

## 2024-11-18 VITALS
SYSTOLIC BLOOD PRESSURE: 136 MMHG | HEIGHT: 69 IN | WEIGHT: 293 LBS | TEMPERATURE: 97.7 F | DIASTOLIC BLOOD PRESSURE: 90 MMHG | HEART RATE: 89 BPM | OXYGEN SATURATION: 98 % | BODY MASS INDEX: 43.4 KG/M2

## 2024-11-18 DIAGNOSIS — N64.4 BREAST TENDERNESS IN FEMALE: Primary | ICD-10-CM

## 2024-11-18 DIAGNOSIS — E07.89 THYROID PAIN: ICD-10-CM

## 2024-11-18 DIAGNOSIS — R35.0 FREQUENCY OF URINATION: Primary | ICD-10-CM

## 2024-11-18 DIAGNOSIS — R10.13 EPIGASTRIC PAIN: ICD-10-CM

## 2024-11-18 DIAGNOSIS — E16.2 LOW BLOOD SUGAR READING: ICD-10-CM

## 2024-11-18 LAB
BILIRUB BLD-MCNC: NEGATIVE MG/DL
CLARITY, POC: CLEAR
COLOR UR: YELLOW
EXPIRATION DATE: NORMAL
EXPIRATION DATE: NORMAL
GLUCOSE UR STRIP-MCNC: NEGATIVE MG/DL
HBA1C MFR BLD: 5.6 % (ref 4.5–5.7)
KETONES UR QL: NEGATIVE
LEUKOCYTE EST, POC: NEGATIVE
Lab: NORMAL
Lab: NORMAL
NITRITE UR-MCNC: NEGATIVE MG/ML
PH UR: 7 [PH] (ref 5–8)
PROT UR STRIP-MCNC: NEGATIVE MG/DL
RBC # UR STRIP: NEGATIVE /UL
SP GR UR: 1.01 (ref 1–1.03)
UROBILINOGEN UR QL: NORMAL

## 2024-11-18 PROCEDURE — 83036 HEMOGLOBIN GLYCOSYLATED A1C: CPT | Performed by: FAMILY MEDICINE

## 2024-11-18 PROCEDURE — 1126F AMNT PAIN NOTED NONE PRSNT: CPT | Performed by: FAMILY MEDICINE

## 2024-11-18 PROCEDURE — 1160F RVW MEDS BY RX/DR IN RCRD: CPT | Performed by: FAMILY MEDICINE

## 2024-11-18 PROCEDURE — 1159F MED LIST DOCD IN RCRD: CPT | Performed by: FAMILY MEDICINE

## 2024-11-18 PROCEDURE — 99214 OFFICE O/P EST MOD 30 MIN: CPT | Performed by: FAMILY MEDICINE

## 2024-11-18 PROCEDURE — 3044F HG A1C LEVEL LT 7.0%: CPT | Performed by: FAMILY MEDICINE

## 2024-11-18 PROCEDURE — 87086 URINE CULTURE/COLONY COUNT: CPT | Performed by: FAMILY MEDICINE

## 2024-11-18 RX ORDER — DOXYCYCLINE 100 MG/1
CAPSULE ORAL
COMMUNITY
Start: 2024-10-16
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
## 2024-11-20 ENCOUNTER — LAB (OUTPATIENT)
Dept: LAB | Facility: HOSPITAL | Age: 29
End: 2024-11-20
Payer: COMMERCIAL

## 2024-11-20 ENCOUNTER — OFFICE VISIT (OUTPATIENT)
Dept: NEUROLOGY | Facility: CLINIC | Age: 29
End: 2024-11-20
Payer: COMMERCIAL

## 2024-11-20 VITALS
OXYGEN SATURATION: 97 % | SYSTOLIC BLOOD PRESSURE: 136 MMHG | HEART RATE: 109 BPM | HEIGHT: 69 IN | WEIGHT: 293 LBS | DIASTOLIC BLOOD PRESSURE: 74 MMHG | BODY MASS INDEX: 43.4 KG/M2

## 2024-11-20 DIAGNOSIS — G43.C0 PERIODIC HEADACHE SYNDROME, NOT INTRACTABLE: Primary | ICD-10-CM

## 2024-11-20 DIAGNOSIS — R10.13 EPIGASTRIC PAIN: ICD-10-CM

## 2024-11-20 DIAGNOSIS — E07.89 THYROID PAIN: ICD-10-CM

## 2024-11-20 LAB — BACTERIA SPEC AEROBE CULT: NORMAL

## 2024-11-20 PROCEDURE — 84443 ASSAY THYROID STIM HORMONE: CPT

## 2024-11-20 PROCEDURE — 84439 ASSAY OF FREE THYROXINE: CPT

## 2024-11-20 PROCEDURE — 86376 MICROSOMAL ANTIBODY EACH: CPT

## 2024-11-20 PROCEDURE — 84481 FREE ASSAY (FT-3): CPT

## 2024-11-20 PROCEDURE — 83690 ASSAY OF LIPASE: CPT
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
## 2024-11-21 LAB
LIPASE SERPL-CCNC: 28 U/L (ref 13–60)
T3FREE SERPL-MCNC: 2.98 PG/ML (ref 2–4.4)
T4 FREE SERPL-MCNC: 0.84 NG/DL (ref 0.92–1.68)
TSH SERPL DL<=0.05 MIU/L-ACNC: 1.59 UIU/ML (ref 0.27–4.2)

## 2024-11-22 ENCOUNTER — TELEPHONE (OUTPATIENT)
Dept: INTERNAL MEDICINE | Facility: CLINIC | Age: 29
End: 2024-11-22
Payer: COMMERCIAL

## 2024-11-22 DIAGNOSIS — E07.89 THYROID PAIN: Primary | ICD-10-CM

## 2024-11-22 LAB — THYROPEROXIDASE AB SERPL-ACNC: <9 IU/ML (ref 0–34)

## 2024-11-22 RX ORDER — METHYLPREDNISOLONE 4 MG/1
TABLET ORAL
Qty: 21 EACH | Refills: 0 | Status: SHIPPED | OUTPATIENT
Start: 2024-11-22

## 2024-11-22 NOTE — TELEPHONE ENCOUNTER
----- Message from Deanna Chen sent at 11/22/2024  5:17 PM EST -----  Please call the patient regarding her abnormal result.  Medrol Dosepak sent to pharmacy for tender thyroid.  Blood work was essentially okay.  Let us know if she is not improving

## 2024-12-17 ENCOUNTER — HOSPITAL ENCOUNTER (OUTPATIENT)
Dept: ULTRASOUND IMAGING | Facility: HOSPITAL | Age: 29
Discharge: HOME | End: 2024-12-17
Admitting: FAMILY MEDICINE
Payer: COMMERCIAL

## 2024-12-17 DIAGNOSIS — E07.89 THYROID PAIN: ICD-10-CM

## 2024-12-17 PROCEDURE — 76536 US EXAM OF HEAD AND NECK: CPT

## 2025-01-02 ENCOUNTER — OFFICE VISIT (OUTPATIENT)
Dept: OBSTETRICS AND GYNECOLOGY | Facility: CLINIC | Age: 30
End: 2025-01-02
Payer: COMMERCIAL

## 2025-01-02 VITALS — WEIGHT: 293 LBS | BODY MASS INDEX: 47.38 KG/M2 | RESPIRATION RATE: 14 BRPM

## 2025-01-02 DIAGNOSIS — Z30.2 REQUEST FOR STERILIZATION: Primary | ICD-10-CM

## 2025-01-02 DIAGNOSIS — R10.31 PAIN, ABDOMINAL, RLQ: ICD-10-CM

## 2025-01-02 DIAGNOSIS — Z30.431 CHECKING OF INTRAUTERINE DEVICE: ICD-10-CM

## 2025-01-02 PROBLEM — J02.0 STREP THROAT: Status: RESOLVED | Noted: 2023-03-20 | Resolved: 2025-01-02

## 2025-01-02 PROBLEM — N92.6 IRREGULAR MENSTRUAL BLEEDING: Status: RESOLVED | Noted: 2022-07-11 | Resolved: 2025-01-02

## 2025-01-02 PROCEDURE — 99213 OFFICE O/P EST LOW 20 MIN: CPT | Performed by: OBSTETRICS & GYNECOLOGY
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
## 2025-01-02 NOTE — PROGRESS NOTES
Subjective   Chief Complaint   Patient presents with    Pre-op Exam       Alicia Rey is a 29 y.o. year old .  No LMP recorded. Patient has had an implant.  She comes today to talk about tubal.  She is certain she wants no more children.  She has an IUD in place.  It works well.  She is very happy with it.  She would like to have the IUD retained after the tubal ligation.  She has had issues with her menstrual cycles and she is very happy with her bleeding patterns with the IUD in.    At times she does have pain in the area of the ovaries and she would also like to have an ultrasound done of possible to see if there is any ovarian cystic activity.  The pain can be significant at times.    The following portions of the patient's history were reviewed and updated as appropriate:current medications and allergies    Social History    Tobacco Use      Smoking status: Former        Packs/day: 0.00        Years: 0.5 packs/day for 9.6 years (4.8 ttl pk-yrs)        Types: Cigarettes        Start date: 2009        Quit date: 2018        Years since quittin.4        Passive exposure: Current      Smokeless tobacco: Never      Tobacco comments: I use e vapes         Objective   Resp 14   Wt (!) 146 kg (321 lb)   BMI 47.38 kg/m²     Lab Review   No data reviewed    Imaging   No data reviewed        Assessment   Desires permanent contraception.  Currently has effective contraception in place with a Mirena IUD which will not be due to  until at least   Right sided pain most likely unrelated to ovarian cystic disease but cannot exclude     Plan   Although we could perform a tubal ligation I see no reason right now we need to do that given that she has a highly effective form of contraception in place.  Explained small risks of surgical mishap if she had a sterilization procedure and if she is planning on keeping the IUD anyway I would strongly encourage her to think about postponing any  decision about sterilization until such time as the IUD is set to  at the end of   Ultrasound needs to be done any time that is convenient for her.   The importance of keeping all planned follow-up and taking all medications as prescribed was emphasized.  Follow up PRN assuming ultrasound normal            This note was electronically signed.    Prosper Borden M.D.  2025      Part of this note may be an electronic transcription/translation of spoken language to printed text using the Dragon Dictation System.

## 2025-04-16 ENCOUNTER — APPOINTMENT (OUTPATIENT)
Dept: GENERAL RADIOLOGY | Facility: HOSPITAL | Age: 30
End: 2025-04-16
Payer: OTHER MISCELLANEOUS

## 2025-04-16 ENCOUNTER — HOSPITAL ENCOUNTER (EMERGENCY)
Facility: HOSPITAL | Age: 30
Discharge: OTHER INSTITUTION | End: 2025-04-16
Attending: EMERGENCY MEDICINE
Payer: OTHER MISCELLANEOUS

## 2025-04-16 VITALS
SYSTOLIC BLOOD PRESSURE: 100 MMHG | BODY MASS INDEX: 43.4 KG/M2 | HEART RATE: 70 BPM | WEIGHT: 293 LBS | HEIGHT: 69 IN | OXYGEN SATURATION: 97 % | RESPIRATION RATE: 1 BRPM | DIASTOLIC BLOOD PRESSURE: 48 MMHG | TEMPERATURE: 98.3 F

## 2025-04-16 DIAGNOSIS — S82.201A CLOSED FRACTURE OF TIBIA AND FIBULA, RIGHT, INITIAL ENCOUNTER: Primary | ICD-10-CM

## 2025-04-16 DIAGNOSIS — S82.401A CLOSED FRACTURE OF TIBIA AND FIBULA, RIGHT, INITIAL ENCOUNTER: Primary | ICD-10-CM

## 2025-04-16 LAB
ALBUMIN SERPL-MCNC: 4.1 G/DL (ref 3.5–5.2)
ALBUMIN/GLOB SERPL: 1.3 G/DL
ALP SERPL-CCNC: 126 U/L (ref 39–117)
ALT SERPL W P-5'-P-CCNC: 22 U/L (ref 1–33)
ANION GAP SERPL CALCULATED.3IONS-SCNC: 11 MMOL/L (ref 5–15)
AST SERPL-CCNC: 18 U/L (ref 1–32)
BASOPHILS # BLD AUTO: 0.02 10*3/MM3 (ref 0–0.2)
BASOPHILS NFR BLD AUTO: 0.2 % (ref 0–1.5)
BILIRUB SERPL-MCNC: 0.3 MG/DL (ref 0–1.2)
BUN SERPL-MCNC: 11 MG/DL (ref 6–20)
BUN/CREAT SERPL: 14.1 (ref 7–25)
CALCIUM SPEC-SCNC: 8.8 MG/DL (ref 8.6–10.5)
CHLORIDE SERPL-SCNC: 104 MMOL/L (ref 98–107)
CO2 SERPL-SCNC: 23 MMOL/L (ref 22–29)
CREAT SERPL-MCNC: 0.78 MG/DL (ref 0.57–1)
DEPRECATED RDW RBC AUTO: 41.6 FL (ref 37–54)
EGFRCR SERPLBLD CKD-EPI 2021: 105.6 ML/MIN/1.73
EOSINOPHIL # BLD AUTO: 0.06 10*3/MM3 (ref 0–0.4)
EOSINOPHIL NFR BLD AUTO: 0.5 % (ref 0.3–6.2)
ERYTHROCYTE [DISTWIDTH] IN BLOOD BY AUTOMATED COUNT: 13.4 % (ref 12.3–15.4)
GLOBULIN UR ELPH-MCNC: 3.1 GM/DL
GLUCOSE SERPL-MCNC: 151 MG/DL (ref 65–99)
HCT VFR BLD AUTO: 44.4 % (ref 34–46.6)
HGB BLD-MCNC: 14.1 G/DL (ref 12–15.9)
IMM GRANULOCYTES # BLD AUTO: 0.04 10*3/MM3 (ref 0–0.05)
IMM GRANULOCYTES NFR BLD AUTO: 0.3 % (ref 0–0.5)
LYMPHOCYTES # BLD AUTO: 1.38 10*3/MM3 (ref 0.7–3.1)
LYMPHOCYTES NFR BLD AUTO: 11.3 % (ref 19.6–45.3)
MAGNESIUM SERPL-MCNC: 2 MG/DL (ref 1.6–2.6)
MCH RBC QN AUTO: 27.3 PG (ref 26.6–33)
MCHC RBC AUTO-ENTMCNC: 31.8 G/DL (ref 31.5–35.7)
MCV RBC AUTO: 85.9 FL (ref 79–97)
MONOCYTES # BLD AUTO: 0.58 10*3/MM3 (ref 0.1–0.9)
MONOCYTES NFR BLD AUTO: 4.7 % (ref 5–12)
NEUTROPHILS NFR BLD AUTO: 10.16 10*3/MM3 (ref 1.7–7)
NEUTROPHILS NFR BLD AUTO: 83 % (ref 42.7–76)
NRBC BLD AUTO-RTO: 0 /100 WBC (ref 0–0.2)
PLATELET # BLD AUTO: 335 10*3/MM3 (ref 140–450)
PMV BLD AUTO: 9.6 FL (ref 6–12)
POTASSIUM SERPL-SCNC: 4 MMOL/L (ref 3.5–5.2)
PROT SERPL-MCNC: 7.2 G/DL (ref 6–8.5)
RBC # BLD AUTO: 5.17 10*6/MM3 (ref 3.77–5.28)
SODIUM SERPL-SCNC: 138 MMOL/L (ref 136–145)
WBC NRBC COR # BLD AUTO: 12.24 10*3/MM3 (ref 3.4–10.8)

## 2025-04-16 PROCEDURE — 96374 THER/PROPH/DIAG INJ IV PUSH: CPT

## 2025-04-16 PROCEDURE — 99285 EMERGENCY DEPT VISIT HI MDM: CPT

## 2025-04-16 PROCEDURE — 73590 X-RAY EXAM OF LOWER LEG: CPT

## 2025-04-16 PROCEDURE — 25010000002 HYDROMORPHONE PER 4 MG: Performed by: EMERGENCY MEDICINE

## 2025-04-16 PROCEDURE — 80053 COMPREHEN METABOLIC PANEL: CPT | Performed by: EMERGENCY MEDICINE

## 2025-04-16 PROCEDURE — 83735 ASSAY OF MAGNESIUM: CPT | Performed by: EMERGENCY MEDICINE

## 2025-04-16 PROCEDURE — 36415 COLL VENOUS BLD VENIPUNCTURE: CPT

## 2025-04-16 PROCEDURE — 73610 X-RAY EXAM OF ANKLE: CPT

## 2025-04-16 PROCEDURE — 25010000002 ONDANSETRON PER 1 MG: Performed by: EMERGENCY MEDICINE

## 2025-04-16 PROCEDURE — 96375 TX/PRO/DX INJ NEW DRUG ADDON: CPT

## 2025-04-16 PROCEDURE — 85025 COMPLETE CBC W/AUTO DIFF WBC: CPT | Performed by: EMERGENCY MEDICINE

## 2025-04-16 RX ORDER — HYDROMORPHONE HYDROCHLORIDE 1 MG/ML
0.5 INJECTION, SOLUTION INTRAMUSCULAR; INTRAVENOUS; SUBCUTANEOUS ONCE
Refills: 0 | Status: COMPLETED | OUTPATIENT
Start: 2025-04-16 | End: 2025-04-16

## 2025-04-16 RX ORDER — ONDANSETRON 2 MG/ML
4 INJECTION INTRAMUSCULAR; INTRAVENOUS ONCE
Status: COMPLETED | OUTPATIENT
Start: 2025-04-16 | End: 2025-04-16

## 2025-04-16 RX ADMIN — ONDANSETRON 4 MG: 2 INJECTION INTRAMUSCULAR; INTRAVENOUS at 17:27

## 2025-04-16 RX ADMIN — HYDROMORPHONE HYDROCHLORIDE 0.5 MG: 1 INJECTION, SOLUTION INTRAMUSCULAR; INTRAVENOUS; SUBCUTANEOUS at 17:27
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
